# Patient Record
Sex: FEMALE | Race: WHITE | Employment: OTHER | URBAN - METROPOLITAN AREA
[De-identification: names, ages, dates, MRNs, and addresses within clinical notes are randomized per-mention and may not be internally consistent; named-entity substitution may affect disease eponyms.]

---

## 2017-04-03 ENCOUNTER — HOSPITAL ENCOUNTER (OUTPATIENT)
Dept: WOMENS IMAGING | Age: 62
Discharge: HOME | End: 2017-04-03
Attending: FAMILY MEDICINE | Admitting: FAMILY MEDICINE

## 2017-04-03 DIAGNOSIS — Z12.39 SCREENING BREAST EXAMINATION: ICD-10-CM

## 2017-09-18 ENCOUNTER — HOSPITAL ENCOUNTER (OUTPATIENT)
Dept: ULTRASOUND IMAGING | Age: 62
Discharge: HOME | End: 2017-09-18
Admitting: SPECIALIST

## 2017-09-18 DIAGNOSIS — K74.3 PRIMARY BILIARY CIRRHOSIS (HCC): ICD-10-CM

## 2019-03-01 ENCOUNTER — HOSPITAL ENCOUNTER (OUTPATIENT)
Dept: WOMENS IMAGING | Age: 64
Discharge: HOME | End: 2019-03-01
Payer: MEDICARE

## 2019-03-01 ENCOUNTER — HOSPITAL ENCOUNTER (OUTPATIENT)
Age: 64
Discharge: HOME | End: 2019-03-01
Payer: MEDICARE

## 2019-03-01 DIAGNOSIS — Z12.31 ENCOUNTER FOR SCREENING MAMMOGRAM FOR BREAST CANCER: ICD-10-CM

## 2019-03-01 LAB
ALBUMIN SERPL-MCNC: 4.2 GM/DL (ref 3.4–5)
ALP BLD-CCNC: 131 IU/L (ref 40–129)
ALT SERPL-CCNC: 40 U/L (ref 10–40)
AST SERPL-CCNC: 35 IU/L (ref 15–37)
BILIRUB SERPL-MCNC: 0.4 MG/DL (ref 0–1)
BILIRUBIN DIRECT: 0.2 MG/DL (ref 0–0.3)
BILIRUBIN, INDIRECT: 0.2 MG/DL (ref 0–0.7)
IGG,SERUM: 1321 MG/DL (ref 723–1685)
IGM,SERUM: 521 MG/DL (ref 62–277)
TOTAL PROTEIN: 7.5 GM/DL (ref 6.4–8.2)

## 2019-03-01 PROCEDURE — 36415 COLL VENOUS BLD VENIPUNCTURE: CPT

## 2019-03-01 PROCEDURE — 86256 FLUORESCENT ANTIBODY TITER: CPT

## 2019-03-01 PROCEDURE — 80076 HEPATIC FUNCTION PANEL: CPT

## 2019-03-01 PROCEDURE — 86038 ANTINUCLEAR ANTIBODIES: CPT

## 2019-03-01 PROCEDURE — 86039 ANTINUCLEAR ANTIBODIES (ANA): CPT

## 2019-03-01 PROCEDURE — 83516 IMMUNOASSAY NONANTIBODY: CPT

## 2019-03-01 PROCEDURE — 77067 SCR MAMMO BI INCL CAD: CPT

## 2019-03-01 PROCEDURE — 82784 ASSAY IGA/IGD/IGG/IGM EACH: CPT

## 2019-03-01 PROCEDURE — 84165 PROTEIN E-PHORESIS SERUM: CPT

## 2019-03-03 LAB
ANTI-MITOCHON TITER: 145.9
ANTI-NUCLEAR ANTIBODY (ANA): DETECTED
F-ACTIN AB, IGG: 40

## 2019-03-04 LAB
ALBUMIN ELP: 3.9 GM/DL (ref 3.2–5.6)
ALPHA-1-GLOBULIN: 0.3 GM/DL (ref 0.1–0.4)
ALPHA-2-GLOBULIN: 0.7 GM/DL (ref 0.4–1.2)
BETA GLOBULIN: 1.1 GM/DL (ref 0.5–1.3)
GAMMA GLOBULIN: 1.6 GM/DL (ref 0.5–1.6)
SMOOTH MUSCLE AB IGG TITER: NORMAL
TOTAL PROTEIN: 7.5 GM/DL (ref 6.4–8.2)

## 2019-03-05 LAB
ANA CYTOPLASMIC TITER: NORMAL
ANTINUCLEAR ANTIBODY, HEP-2, IGG: NORMAL
INTERPRETATION: NORMAL

## 2020-11-24 ENCOUNTER — OFFICE VISIT (OUTPATIENT)
Dept: PRIMARY CARE CLINIC | Age: 65
End: 2020-11-24
Payer: MEDICARE

## 2020-11-24 ENCOUNTER — HOSPITAL ENCOUNTER (OUTPATIENT)
Age: 65
Setting detail: SPECIMEN
Discharge: HOME | End: 2020-11-24
Payer: MEDICARE

## 2020-11-24 VITALS — TEMPERATURE: 97.2 F

## 2020-11-24 PROCEDURE — G8428 CUR MEDS NOT DOCUMENT: HCPCS | Performed by: NURSE PRACTITIONER

## 2020-11-24 PROCEDURE — 1036F TOBACCO NON-USER: CPT | Performed by: NURSE PRACTITIONER

## 2020-11-24 PROCEDURE — 1123F ACP DISCUSS/DSCN MKR DOCD: CPT | Performed by: NURSE PRACTITIONER

## 2020-11-24 PROCEDURE — 1090F PRES/ABSN URINE INCON ASSESS: CPT | Performed by: NURSE PRACTITIONER

## 2020-11-24 PROCEDURE — G8421 BMI NOT CALCULATED: HCPCS | Performed by: NURSE PRACTITIONER

## 2020-11-24 PROCEDURE — U0002 COVID-19 LAB TEST NON-CDC: HCPCS

## 2020-11-24 PROCEDURE — 4040F PNEUMOC VAC/ADMIN/RCVD: CPT | Performed by: NURSE PRACTITIONER

## 2020-11-24 PROCEDURE — G8484 FLU IMMUNIZE NO ADMIN: HCPCS | Performed by: NURSE PRACTITIONER

## 2020-11-24 PROCEDURE — G8400 PT W/DXA NO RESULTS DOC: HCPCS | Performed by: NURSE PRACTITIONER

## 2020-11-24 PROCEDURE — 3017F COLORECTAL CA SCREEN DOC REV: CPT | Performed by: NURSE PRACTITIONER

## 2020-11-24 PROCEDURE — 99213 OFFICE O/P EST LOW 20 MIN: CPT | Performed by: NURSE PRACTITIONER

## 2020-11-24 NOTE — PATIENT INSTRUCTIONS
Your COVID 19 test can take 3-5 days for the results come back. We ask that you make a Mychart page and view your test results this way. You will need to Self quarantine until you know your results. Increase fluids rest  Saline nasal spray as directed  Warm salt gargles for throat discomfort  Monitor temperature twice a day  Tylenol for fevers and/or discomfort. If symptoms are worse -Go to the ER. Follow up with your primary doctor    To Whom it May Concern:    Eloy Rosa has been tested for COVID on 11/24/20. They may NOT return to work until their lab test results back and they been fever free for 3 days. If test is positive they must stay home for 2 weeks or until they test negative or as directed by the [facility] Department.

## 2020-11-24 NOTE — PROGRESS NOTES
11/24/20  [facility]  1955    FLU/COVID-19 CLINIC EVALUATION    HPI SYMPTOMS: Pt's brother is (+) Covid    Employer: Retired    [] Fevers  [] Chills  [x] Cough  [] Coughing up blood  [] Chest Congestion  [x] Nasal Congestion  [] Feeling short of breath  [] Sometimes  [] Frequently  [] All the time  [] Chest pain  [x] Headaches  [x]Tolerable  [] Severe  [x] Sore throat  [] Muscle aches  [x] Nausea  [] Vomiting  []Unable to keep fluids down  [] Diarrhea  []Severe    [x] OTHER SYMPTOMS: FATIGUE      Symptom Duration:   [] 1  [] 2   [] 3   [] 4    [] 5   [x] 6   [] 7   [] 8   [] 9   [] 10   [] 11   [] 12   [] 13   [] 14   [] Longer than 14 days    Symptom course:   [x] Worsening     [] Stable     [] Improving    RISK FACTORS:    [] Pregnant or possibly pregnant  [x] Age over 61  [] Diabetes  [] Heart disease  [] Asthma  [] COPD/Other chronic lung diseases  [] Active Cancer  [] On Chemotherapy  [] Taking oral steroids  [] History Lymphoma/Leukemia  [x] Close contact with a lab confirmed COVID-19 patient within 14 days of symptom onset  [] History of travel from affected geographical areas within 14 days of symptom onset       VITALS:  There were no vitals filed for this visit. TESTS:    POCT FLU:  [] Positive     []Negative    ASSESSMENT:    [] Flu  [] Possible COVID-19  [] Strep    PLAN:    [] Discharge home with written instructions for:  [] Flu management  [] Possible COVID-19 infection with self-quarantine and management of symptoms  [] Follow-up with primary care physician or emergency department if worsens  [] Evaluation per physician/NP/PA in clinic  [] Sent to ER       An  electronic signature was used to authenticate this note.      --Flakita Anguiano MA on 11/24/2020 at 10:40 AM

## 2020-11-25 LAB
SARS-COV-2: DETECTED
SOURCE: ABNORMAL

## 2021-04-12 ENCOUNTER — HOSPITAL ENCOUNTER (OUTPATIENT)
Dept: WOMENS IMAGING | Age: 66
Discharge: HOME | End: 2021-04-12
Payer: MEDICARE

## 2021-04-12 DIAGNOSIS — Z12.31 SCREENING MAMMOGRAM, ENCOUNTER FOR: ICD-10-CM

## 2021-04-12 PROCEDURE — 77067 SCR MAMMO BI INCL CAD: CPT

## 2021-06-01 ENCOUNTER — HOSPITAL ENCOUNTER (OUTPATIENT)
Dept: ULTRASOUND IMAGING | Age: 66
Discharge: HOME | End: 2021-06-01
Payer: MEDICARE

## 2021-06-01 ENCOUNTER — HOSPITAL ENCOUNTER (OUTPATIENT)
Age: 66
Discharge: HOME | End: 2021-06-01
Payer: MEDICARE

## 2021-06-01 DIAGNOSIS — K74.3 CHOLANGITIC CIRRHOSIS (HCC): ICD-10-CM

## 2021-06-01 LAB
ALBUMIN SERPL-MCNC: 4.2 GM/DL (ref 3.4–5)
ALP BLD-CCNC: 119 IU/L (ref 40–129)
ALT SERPL-CCNC: 36 U/L (ref 10–40)
AST SERPL-CCNC: 27 IU/L (ref 15–37)
BILIRUB SERPL-MCNC: 0.7 MG/DL (ref 0–1)
BILIRUBIN DIRECT: 0.2 MG/DL (ref 0–0.3)
BILIRUBIN, INDIRECT: 0.5 MG/DL (ref 0–0.7)
TOTAL PROTEIN: 6.9 GM/DL (ref 6.4–8.2)

## 2021-06-01 PROCEDURE — 36415 COLL VENOUS BLD VENIPUNCTURE: CPT

## 2021-06-01 PROCEDURE — 80076 HEPATIC FUNCTION PANEL: CPT

## 2021-06-01 PROCEDURE — 76705 ECHO EXAM OF ABDOMEN: CPT

## 2021-06-10 ENCOUNTER — HOSPITAL ENCOUNTER (OUTPATIENT)
Dept: CT IMAGING | Age: 66
Discharge: HOME | End: 2021-06-10
Payer: MEDICARE

## 2021-06-10 DIAGNOSIS — R10.10 UPPER ABDOMINAL PAIN: ICD-10-CM

## 2021-06-10 DIAGNOSIS — K74.60 HEPATIC CIRRHOSIS, UNSPECIFIED HEPATIC CIRRHOSIS TYPE, UNSPECIFIED WHETHER ASCITES PRESENT (HCC): ICD-10-CM

## 2021-06-10 DIAGNOSIS — R93.3 ABNORMAL VIRTUAL COLONOSCOPE: ICD-10-CM

## 2021-06-10 LAB
GFR AFRICAN AMERICAN: >60 ML/MIN/1.73M2
GFR NON-AFRICAN AMERICAN: >60 ML/MIN/1.73M2
POC CREATININE: 0.4 MG/DL (ref 0.6–1.1)

## 2021-06-10 PROCEDURE — 6360000004 HC RX CONTRAST MEDICATION: Performed by: SPECIALIST

## 2021-06-10 PROCEDURE — 2580000003 HC RX 258: Performed by: SPECIALIST

## 2021-06-10 PROCEDURE — 74170 CT ABD WO CNTRST FLWD CNTRST: CPT

## 2021-06-10 RX ORDER — SODIUM CHLORIDE 0.9 % (FLUSH) 0.9 %
10 SYRINGE (ML) INJECTION PRN
Status: DISCONTINUED | OUTPATIENT
Start: 2021-06-10 | End: 2021-06-11 | Stop reason: HOSPADM

## 2021-06-10 RX ADMIN — SODIUM CHLORIDE, PRESERVATIVE FREE 10 ML: 5 INJECTION INTRAVENOUS at 08:52

## 2021-06-10 RX ADMIN — IOPAMIDOL 95 ML: 755 INJECTION, SOLUTION INTRAVENOUS at 08:55

## 2021-06-16 ENCOUNTER — HOSPITAL ENCOUNTER (OUTPATIENT)
Age: 66
Discharge: HOME | End: 2021-06-16
Payer: MEDICARE

## 2021-06-16 LAB
ALBUMIN SERPL-MCNC: 4.4 GM/DL (ref 3.4–5)
ALP BLD-CCNC: 119 IU/L (ref 40–129)
ALT SERPL-CCNC: 28 U/L (ref 10–40)
AST SERPL-CCNC: 24 IU/L (ref 15–37)
BILIRUB SERPL-MCNC: 0.6 MG/DL (ref 0–1)
BILIRUBIN DIRECT: 0.2 MG/DL (ref 0–0.3)
BILIRUBIN, INDIRECT: 0.4 MG/DL (ref 0–0.7)
TOTAL PROTEIN: 7 GM/DL (ref 6.4–8.2)

## 2021-06-16 PROCEDURE — 80076 HEPATIC FUNCTION PANEL: CPT

## 2021-06-16 PROCEDURE — 82105 ALPHA-FETOPROTEIN SERUM: CPT

## 2021-06-16 PROCEDURE — 36415 COLL VENOUS BLD VENIPUNCTURE: CPT

## 2021-06-16 NOTE — PROGRESS NOTES
Patient Name:  Amarilis Aleman  Patient :  1955  Patient MRN:  [id number]     Primary Oncologist: Keely Cat MD  Referring Provider: Holli Rothman MD     Date of Service: 2021      Reason for Consult: ? Hepatocellular carcinoma     Chief Complaint:    Chief Complaint   Patient presents with    New Patient        There is no problem list on file for this patient. HPI:   Alex Stewart is a pleasant 58-year-old female patient who was referred for evaluation of ?hepatocellular carcinoma. She used to live in [place] and moved back to [facility] in 2017. She was diagnosed with primary biliary cholangitis in [place] and has been on Actigall for about 10 years. She was diagnosed with pulmonary sarcoidosis in  and at that time she was treated with high-dose prednisone. She gained about 60 pounds and eventually has lost 100 pounds slowly in the last 10 years. In 2019 VIOLETTA was detected, antimitochondrial titer was 145.9H, F-actin IgG was 40H. IgG was 1321, IgM 521H. Ultrasound of abdomen on 2021 showed  Cirrhotic liver.  Hyperechoic irregular liver lesion to the right lobe of the  liver, new from prior exam, indeterminate.  Neoplastic etiology is of concern  in the setting of cirrhosis.  Recommend further evaluation with dedicated  liver mass protocol MRI with and without contrast.   Stones and sludge within the gallbladder without sonographic findings for  acute cholecystitis.   Mildly prominent common bile duct at 8 mm.  No intrahepatic biliary ductal  dilatation is noted.  Clinical and laboratory correlation     CT abdomen on Sarita 10, 2021 showed  1. Vague area of hypoenhancement centered in segment 8 but extends into  segments 4A and B measuring approximately 4.4 x 2.5 x 6.6 cm in maximum  dimensions (transverse by AP by CC).   Findings correlate to recent ultrasound  findings.  The arterial phase is otherwise suboptimal on today's exam due to  early scanning/bolus.  Findings remain indeterminate for hepatocellular  carcinoma.  Subtle vessels however can be seen coursing through these areas  favoring areas of focal fatty infiltration.  Recommend dedicated liver MRI if  the patient is able.  Also recommend correlation with AFP. 2. Cirrhosis with portal hypertension. 3. Cholelithiasis. 4. Nonobstructing bilateral renal calculi. In 2017 WBC was 4.5, Hg 14.1 and platelet 177. Serum alpha-fetoprotein on June 16, 2021 was 4. LFTs were unremarkable. Mammogram in April 2021 was benign. She is scheduled for upper and lower endoscopic study in the near future. Is otherwise feeling great today. She is scheduled for MRI of of the liver on June 29, 2021. Depending on the results we may consider biopsy of the liver. I also discussed about potential genetic counseling due to family history of malignancy. Past Medical History:   Seasonal allergy. HTN/white coat, sarcoidosis. PBC with liver cirrhosis. .    Past Surgery History:   BIlateral hip prosthesis. Social History:   She denied any history of smoking. No alcohol or illicit drug use. She has 2 daughters. Family History:   Father had colon cancer, prostate cancer and melanoma. Mother had non-Hodgkin lymphoma. Allergies   Allergen Reactions    Prednisone      Has a sensitivity       Current Outpatient Medications on File Prior to Visit   Medication Sig Dispense Refill    ursodiol (ACTIGALL) 300 MG capsule Take 1 capsule by mouth 4 times daily      aspirin 325 MG tablet Take 325 mg by mouth daily      Multiple Vitamins-Minerals (MULTIVITAMIN WOMEN 50+ PO) Take 1 tablet by mouth daily       No current facility-administered medications on file prior to visit. Review of Systems:    Constitutional:  No weight loss, No fever, No chills, No night sweats. Energy level good. Eyes:  No diplopia, No transient or permanent loss of vision, No scotomata.   ENT / Mouth:  No epistaxis, No dysphagia, No hoarseness, No oral ulcers, No gingival bleeding. No sore throat, No postnasal drip, No nasal drip, No mouth pain, No sinus pain, No tinnitus, Normal hearing. Cardiovascular:  No chest pain, No palpitations, No syncope, No upper extremity edema, No lower extremity edema, No calf discomfort. Respiratory:  No cough. No hemoptysis, No pleurisy, No wheezing, No dyspnea. Breast:  No breast mass, No pain, No nipple discharge, No change in size, No change in shape. Gastrointestinal:  No abdominal pain, No abdominal cramping, No nausea, No vomiting, No constipation, No diarrhea, No hematochezia, No melena, No jaundice, No dyspepsia, No dysphagia. Urinary:  No dysuria, No hematuria, No urinary incontinence. Gynecological:  No vaginal discharge, No suprapubic pain, No abnormal vaginal bleeding. (Female Patients Only)  Musculoskeletal:  No muscle pain, No swollen joints, No joint redness, No bone pain, No spine tenderness. Skin:  No rash, No nodules, No pruritus, No lesions. Neurologic:  No confusion, No seizures, No syncope, No tremor, No speech change, No headache, No hiccups, No abnormal gait, No sensory changes, No weakness. Psychiatric:  No depression, No anxiety, Concentration normal.  Endocrine:  No polyuria, No polydipsia, No hot flashes, No thyroid symptoms. Hematologic:  No epistaxis, No gingival bleeding, No petechiae, No ecchymosis. Lymphatic:  No lymphadenopathy, No lymphedema. Allergy / Immunologic:  No eczema, No frequent mucous infections, No frequent respiratory infections, No recurrent urticarial, No frequent skin infections.      Vital Signs: BP (!) 218/95 (Site: Left Upper Arm, Position: Sitting, Cuff Size: Medium Adult)   Pulse 79   Temp 96 °F (35.6 °C) (Infrared)   Resp 12   Ht 5' 3\" (1.6 m)   Wt 199 lb 6.4 oz (90.4 kg)   SpO2 96%   BMI 35.32 kg/m²      Physical Exam:  CONSTITUTIONAL: awake, alert, cooperative, no apparent distress   EYES: pupils equal, round and reactive to light, Component Value Date    PROTIME 11.6 09/18/2017    INR 1.02 09/18/2017    APTT 24.8 09/18/2017      Observations:  PHQ-9 Total Score: 0 (6/23/2021 10:00 AM)     Assessment & Plan:  1. She was suspected to have hepatocellular carcinoma with an underlying primary biliary cholangitis. She is on Actigall. Serum alpha-fetoprotein was 4. She is scheduled for MRI of the liver on June 29, 2021. We went over briefly the NCCN guideline. 2.  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 I will check CBC today. 3.  She has primary biliary cholangitis. She is on Actigall. She is scheduled for upper and lower endoscopic study in the near future. 4.  I referred to cardiologist for work-up of heart normal.    Return to clinic in 2 to 3 weeks or sooner. All of her question has been answered for today. I have discussed the above stated plan with the patient and they verbalized understanding and agreed with the plan. Thank you for allowing us to participate in this patient's care.       Electronically signed by Chetan Fuentes MD on 6/16/21 at 8:27 AM EDT

## 2021-06-17 LAB — MS ALPHA-FETOPROTEIN: 4 NG/ML (ref 0–9)

## 2021-06-23 ENCOUNTER — HOSPITAL ENCOUNTER (OUTPATIENT)
Dept: INFUSION THERAPY | Age: 66
Discharge: HOME | End: 2021-06-23
Payer: MEDICARE

## 2021-06-23 ENCOUNTER — INITIAL CONSULT (OUTPATIENT)
Dept: ONCOLOGY | Age: 66
End: 2021-06-23
Payer: MEDICARE

## 2021-06-23 VITALS
SYSTOLIC BLOOD PRESSURE: 218 MMHG | RESPIRATION RATE: 12 BRPM | HEIGHT: 63 IN | HEART RATE: 79 BPM | DIASTOLIC BLOOD PRESSURE: 95 MMHG | BODY MASS INDEX: 35.33 KG/M2 | OXYGEN SATURATION: 96 % | WEIGHT: 199.4 LBS | TEMPERATURE: 96 F

## 2021-06-23 DIAGNOSIS — R01.1 HEART MURMUR: ICD-10-CM

## 2021-06-23 DIAGNOSIS — K74.3 HEPATIC CIRRHOSIS DUE TO PRIMARY BILIARY CHOLANGITIS (HCC): ICD-10-CM

## 2021-06-23 DIAGNOSIS — K74.3 HEPATIC CIRRHOSIS DUE TO PRIMARY BILIARY CHOLANGITIS (HCC): Primary | ICD-10-CM

## 2021-06-23 LAB
BASOPHILS ABSOLUTE: 0 K/CU MM
BASOPHILS RELATIVE PERCENT: 0.6 % (ref 0–1)
DIFFERENTIAL TYPE: ABNORMAL
EOSINOPHILS ABSOLUTE: 0.2 K/CU MM
EOSINOPHILS RELATIVE PERCENT: 3.4 % (ref 0–3)
HCT VFR BLD CALC: 41.7 % (ref 37–47)
HEMOGLOBIN: 14.4 GM/DL (ref 12.5–16)
LYMPHOCYTES ABSOLUTE: 1.1 K/CU MM
LYMPHOCYTES RELATIVE PERCENT: 22.7 % (ref 24–44)
MCH RBC QN AUTO: 29.3 PG (ref 27–31)
MCHC RBC AUTO-ENTMCNC: 34.5 % (ref 32–36)
MCV RBC AUTO: 84.8 FL (ref 78–100)
MONOCYTES ABSOLUTE: 0.6 K/CU MM
MONOCYTES RELATIVE PERCENT: 12.8 % (ref 0–4)
PDW BLD-RTO: 15.3 % (ref 11.7–14.9)
PLATELET # BLD: 91 K/CU MM (ref 140–440)
PMV BLD AUTO: 11 FL (ref 7.5–11.1)
RBC # BLD: 4.92 M/CU MM (ref 4.2–5.4)
SEGMENTED NEUTROPHILS ABSOLUTE COUNT: 2.9 K/CU MM
SEGMENTED NEUTROPHILS RELATIVE PERCENT: 60.5 % (ref 36–66)
WBC # BLD: 4.8 K/CU MM (ref 4–10.5)

## 2021-06-23 PROCEDURE — 99202 OFFICE O/P NEW SF 15 MIN: CPT

## 2021-06-23 PROCEDURE — 4040F PNEUMOC VAC/ADMIN/RCVD: CPT | Performed by: INTERNAL MEDICINE

## 2021-06-23 PROCEDURE — 1123F ACP DISCUSS/DSCN MKR DOCD: CPT | Performed by: INTERNAL MEDICINE

## 2021-06-23 PROCEDURE — 36415 COLL VENOUS BLD VENIPUNCTURE: CPT

## 2021-06-23 PROCEDURE — 99205 OFFICE O/P NEW HI 60 MIN: CPT | Performed by: INTERNAL MEDICINE

## 2021-06-23 PROCEDURE — 3017F COLORECTAL CA SCREEN DOC REV: CPT | Performed by: INTERNAL MEDICINE

## 2021-06-23 PROCEDURE — G8400 PT W/DXA NO RESULTS DOC: HCPCS | Performed by: INTERNAL MEDICINE

## 2021-06-23 PROCEDURE — G8427 DOCREV CUR MEDS BY ELIG CLIN: HCPCS | Performed by: INTERNAL MEDICINE

## 2021-06-23 PROCEDURE — 85025 COMPLETE CBC W/AUTO DIFF WBC: CPT

## 2021-06-23 PROCEDURE — G8417 CALC BMI ABV UP PARAM F/U: HCPCS | Performed by: INTERNAL MEDICINE

## 2021-06-23 PROCEDURE — 1036F TOBACCO NON-USER: CPT | Performed by: INTERNAL MEDICINE

## 2021-06-23 PROCEDURE — 1090F PRES/ABSN URINE INCON ASSESS: CPT | Performed by: INTERNAL MEDICINE

## 2021-06-23 RX ORDER — URSODIOL 300 MG/1
1 CAPSULE ORAL 4 TIMES DAILY
COMMUNITY
Start: 2021-04-16

## 2021-06-23 RX ORDER — ASPIRIN 325 MG
325 TABLET ORAL DAILY
COMMUNITY
End: 2021-09-14

## 2021-06-23 ASSESSMENT — PATIENT HEALTH QUESTIONNAIRE - PHQ9
SUM OF ALL RESPONSES TO PHQ QUESTIONS 1-9: 0
SUM OF ALL RESPONSES TO PHQ QUESTIONS 1-9: 0
2. FEELING DOWN, DEPRESSED OR HOPELESS: 0
1. LITTLE INTEREST OR PLEASURE IN DOING THINGS: 0
SUM OF ALL RESPONSES TO PHQ9 QUESTIONS 1 & 2: 0
SUM OF ALL RESPONSES TO PHQ QUESTIONS 1-9: 0

## 2021-06-23 NOTE — PROGRESS NOTES
MA Rooming Questions  Patient: Gustabo Mattson  MRN: [id number]    Date: 6/23/2021        NP    5. Did the patient have a depression screening completed today?  Yes    PHQ-9 Total Score: 0 (6/23/2021 10:00 AM)       PHQ-9 Given to (if applicable):               PHQ-9 Score (if applicable):                     [] Positive     [x]  Negative              Does question #9 need addressed (if applicable)                     [] Yes    []  No               Neiad Johnston CMA

## 2021-06-24 NOTE — PROGRESS NOTES
Patient Name:  León Loya  Patient :  1955  Patient MRN:  [id number]     Primary Oncologist: Ty Eng MD  Referring Provider: Bhargavi Palm MD     Date of Service: 2021         Chief Complaint:    Chief Complaint   Patient presents with    Follow-up     Sh came in for follow up visit. There is no problem list on file for this patient. HPI:   Lalita Taylor is a pleasant 64-year-old female patient who was referred for evaluation of ?hepatocellular carcinoma. She used to live in [place] and moved back to [facility] in 2017. She was diagnosed with primary biliary cholangitis in [place] and has been on Actigall for about 10 years. She was diagnosed with pulmonary sarcoidosis in  and at that time she was treated with high-dose prednisone. She gained about 60 pounds and eventually has lost 100 pounds slowly in the last 10 years. In 2019 VIOLETTA was detected, antimitochondrial titer was 145.9H, F-actin IgG was 40H. IgG was 1321, IgM 521H. Ultrasound of abdomen on 2021 showed  Cirrhotic liver.  Hyperechoic irregular liver lesion to the right lobe of the  liver, new from prior exam, indeterminate.  Neoplastic etiology is of concern  in the setting of cirrhosis.  Recommend further evaluation with dedicated  liver mass protocol MRI with and without contrast.   Stones and sludge within the gallbladder without sonographic findings for  acute cholecystitis.   Mildly prominent common bile duct at 8 mm.  No intrahepatic biliary ductal  dilatation is noted.  Clinical and laboratory correlation     CT abdomen on Sarita 10, 2021 showed  1. Vague area of hypoenhancement centered in segment 8 but extends into  segments 4A and B measuring approximately 4.4 x 2.5 x 6.6 cm in maximum  dimensions (transverse by AP by CC).   Findings correlate to recent ultrasound  findings.  The arterial phase is otherwise suboptimal on today's exam due to  early scanning/bolus.  Findings remain indeterminate for hepatocellular  carcinoma.  Subtle vessels however can be seen coursing through these areas  favoring areas of focal fatty infiltration.  Recommend dedicated liver MRI if  the patient is able.  Also recommend correlation with AFP. 2. Cirrhosis with portal hypertension. 3. Cholelithiasis. 4. Nonobstructing bilateral renal calculi. In 2017 WBC was 4.5, Hg 14.1 and platelet 460. Serum alpha-fetoprotein on June 16, 2021 was 4. LFTs were unremarkable. Mammogram in April 2021 was benign. She is scheduled for upper and lower endoscopic study in the near future. Is otherwise feeling great today. She is scheduled for MRI of of the liver on June 29, 2021. Depending on the results we may consider biopsy of the liver. I also discussed about potential genetic counseling due to family history of malignancy. On July 7, 2021 she came in for follow-up visit. MRI of liver 6/29/2021:  Signal abnormality on the CT exam loses signal on out of phase images,  compatible with fat.  Contrast was not administered on this study, therefore  evaluation for enhancement in this region is not possible.  While focal fat  is favored, it is noted that arterial phase on recent CT exam with  suboptimal.  Recommend correlation with AFP levels.  Consider follow-up  imaging in 3 months time, with multi phasic imaging, to exclude any  hypervascular nodule given the underlying cirrhosis.   Cirrhosis and splenomegaly.  Spine scattered splenic siderotic nodules are  incidentally noted   Cholelithiasis  Serum alpha-fetoprotein was 4. She is currently asymptomatic. She is agreeable to a follow-up MRI with liver protocol in September 2021. Mammogram in April 2021 was benign. She denied any acute pain. No nausea, vomiting or diarrhea. No fever or chills. No bowel habit changes. No dysuria or hematuria.   She has been seen by cardiologist.  She is scheduled for the stress test.    Past Medical History: 60.5 06/23/2021    EOSRELPCT 3.4 (H) 06/23/2021    BASOPCT 0.6 06/23/2021    LYMPHOPCT 22.7 (L) 06/23/2021    MONOPCT 12.8 (H) 06/23/2021    SEGSABS 2.9 06/23/2021    EOSABS 0.2 06/23/2021    BASOSABS 0.0 06/23/2021    LYMPHSABS 1.1 06/23/2021    MONOSABS 0.6 06/23/2021    DIFFTYPE AUTOMATED DIFFERENTIAL 06/23/2021     Chemistry:  Lab Results   Component Value Date    CREATININE 0.4 (L) 06/10/2021    PROT 7.0 06/16/2021    LABALBU 4.4 06/16/2021    BILITOT 0.6 06/16/2021    ALKPHOS 119 06/16/2021    AST 24 06/16/2021    ALT 28 06/16/2021    LABGLOM >60 06/10/2021    GFRAA >60 06/10/2021     Immunology:  Lab Results   Component Value Date    PROT 7.0 06/16/2021    ALBUMINELP 3.9 03/01/2019    LABALPH 0.3 03/01/2019    LABALPH 0.7 03/01/2019    LABBETA 1.1 03/01/2019    GAMGLOB 1.6 03/01/2019     Coagulation Panel:  Lab Results   Component Value Date    PROTIME 11.6 09/18/2017    INR 1.02 09/18/2017    APTT 24.8 09/18/2017      Observations:  No data recorded     Assessment & Plan:  1. She was suspected to have hepatocellular carcinoma with an underlying primary biliary cholangitis. She is on Actigall. Serum alpha-fetoprotein in June 2021 was 4. MRI of the liver in June 2021 was reviewed. She is currently asymptomatic. She is agreeable to have follow-up MRI of the liver in September 2021.    2.  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 Platelet in June 5260 was 91.    3.  She has primary biliary cholangitis. She is on Actigall. She is scheduled for upper and lower endoscopic study in the near future. 4.  I referred to cardiologist for work-up of heart normal.  She is scheduled for stress test on July 8, 2021. Return to clinic in 8 weeks or sooner. All of her question has been answered for today.       Electronically signed by Manuel Wright MD on 6/16/21 at 8:27 AM EDT

## 2021-06-29 ENCOUNTER — HOSPITAL ENCOUNTER (OUTPATIENT)
Dept: MRI IMAGING | Age: 66
Discharge: HOME | End: 2021-06-29
Payer: MEDICARE

## 2021-06-29 DIAGNOSIS — C22.9 MALIGNANT NEOPLASM OF LIVER, UNSPECIFIED LIVER MALIGNANCY TYPE (HCC): ICD-10-CM

## 2021-06-29 PROCEDURE — 74181 MRI ABDOMEN W/O CONTRAST: CPT

## 2021-07-07 ENCOUNTER — OFFICE VISIT (OUTPATIENT)
Dept: ONCOLOGY | Age: 66
End: 2021-07-07
Payer: MEDICARE

## 2021-07-07 ENCOUNTER — HOSPITAL ENCOUNTER (OUTPATIENT)
Dept: INFUSION THERAPY | Age: 66
Discharge: HOME | End: 2021-07-07
Payer: MEDICARE

## 2021-07-07 VITALS
TEMPERATURE: 98.8 F | OXYGEN SATURATION: 98 % | DIASTOLIC BLOOD PRESSURE: 88 MMHG | HEART RATE: 78 BPM | BODY MASS INDEX: 34.52 KG/M2 | SYSTOLIC BLOOD PRESSURE: 162 MMHG | WEIGHT: 194.8 LBS | HEIGHT: 63 IN | RESPIRATION RATE: 18 BRPM

## 2021-07-07 DIAGNOSIS — K76.9 LIVER LESION: Primary | ICD-10-CM

## 2021-07-07 DIAGNOSIS — D68.9 COAGULATION DEFECT (HCC): ICD-10-CM

## 2021-07-07 PROCEDURE — 99213 OFFICE O/P EST LOW 20 MIN: CPT | Performed by: INTERNAL MEDICINE

## 2021-07-07 PROCEDURE — 1090F PRES/ABSN URINE INCON ASSESS: CPT | Performed by: INTERNAL MEDICINE

## 2021-07-07 PROCEDURE — 1123F ACP DISCUSS/DSCN MKR DOCD: CPT | Performed by: INTERNAL MEDICINE

## 2021-07-07 PROCEDURE — 4040F PNEUMOC VAC/ADMIN/RCVD: CPT | Performed by: INTERNAL MEDICINE

## 2021-07-07 PROCEDURE — G8417 CALC BMI ABV UP PARAM F/U: HCPCS | Performed by: INTERNAL MEDICINE

## 2021-07-07 PROCEDURE — 99211 OFF/OP EST MAY X REQ PHY/QHP: CPT

## 2021-07-07 PROCEDURE — G8400 PT W/DXA NO RESULTS DOC: HCPCS | Performed by: INTERNAL MEDICINE

## 2021-07-07 PROCEDURE — 1036F TOBACCO NON-USER: CPT | Performed by: INTERNAL MEDICINE

## 2021-07-07 PROCEDURE — G8427 DOCREV CUR MEDS BY ELIG CLIN: HCPCS | Performed by: INTERNAL MEDICINE

## 2021-07-07 PROCEDURE — 3017F COLORECTAL CA SCREEN DOC REV: CPT | Performed by: INTERNAL MEDICINE

## 2021-07-07 NOTE — PROGRESS NOTES
MA Rooming Questions  Patient: Audra Mcqueen  MRN: [id number]    Date: 7/7/2021        1. Do you have any new issues?   no         2. Do you need any refills on medications?    no    3. Have you had any imaging done since your last visit? Yes- MRI    4. Have you been hospitalized or seen in the emergency room since your last visit here?   no    5. Did the patient have a depression screening completed today?  No    No data recorded     PHQ-9 Given to (if applicable):               PHQ-9 Score (if applicable):                     [] Positive     []  Negative              Does question #9 need addressed (if applicable)                     [] Yes    []  No               Marivel Carrera CMA

## 2021-08-15 NOTE — PROGRESS NOTES
Patient Name:  Luigi Prader  Patient :  1955  Patient MRN:  [id number]     Primary Oncologist: Juan Alberto Mattson MD  Referring Provider: Davis White MD     Date of Service: 2021         Chief Complaint:    Chief Complaint   Patient presents with    Follow-up    Results     MRI     Sh came in for follow up visit. There is no problem list on file for this patient. HPI:   Carlota Hart is a pleasant 24-year-old female patient who was referred for evaluation of ?hepatocellular carcinoma. She used to live in [place] and moved back to [facility] in 2017. She was diagnosed with primary biliary cholangitis in [place] and has been on Actigall for about 10 years. She was diagnosed with pulmonary sarcoidosis in  and at that time she was treated with high-dose prednisone. She gained about 60 pounds and eventually has lost 100 pounds slowly in the last 10 years. In 2019 VIOLETTA was detected, antimitochondrial titer was 145.9H, F-actin IgG was 40H. IgG was 1321, IgM 521H. Ultrasound of abdomen on 2021 showed  Cirrhotic liver.  Hyperechoic irregular liver lesion to the right lobe of the  liver, new from prior exam, indeterminate.  Neoplastic etiology is of concern  in the setting of cirrhosis.  Recommend further evaluation with dedicated  liver mass protocol MRI with and without contrast.   Stones and sludge within the gallbladder without sonographic findings for  acute cholecystitis.   Mildly prominent common bile duct at 8 mm.  No intrahepatic biliary ductal  dilatation is noted.  Clinical and laboratory correlation     CT abdomen on Sarita 10, 2021 showed  1. Vague area of hypoenhancement centered in segment 8 but extends into  segments 4A and B measuring approximately 4.4 x 2.5 x 6.6 cm in maximum  dimensions (transverse by AP by CC).   Findings correlate to recent ultrasound  findings.  The arterial phase is otherwise suboptimal on today's exam due to  early scanning/bolus.  Findings remain indeterminate for hepatocellular  carcinoma.  Subtle vessels however can be seen coursing through these areas  favoring areas of focal fatty infiltration.  Recommend dedicated liver MRI if  the patient is able.  Also recommend correlation with AFP. 2. Cirrhosis with portal hypertension. 3. Cholelithiasis. 4. Nonobstructing bilateral renal calculi. In 2017 WBC was 4.5, Hg 14.1 and platelet 814. Serum alpha-fetoprotein on June 16, 2021 was 4. LFTs were unremarkable. Mammogram in April 2021 was benign. She is scheduled for upper and lower endoscopic study in the near future. Is otherwise feeling great today. She is scheduled for MRI of of the liver on June 29, 2021. Depending on the results we may consider biopsy of the liver. I also discussed about potential genetic counseling due to family history of malignancy. MRI of liver 6/29/2021:  Signal abnormality on the CT exam loses signal on out of phase images,  compatible with fat.  Contrast was not administered on this study, therefore  evaluation for enhancement in this region is not possible.  While focal fat  is favored, it is noted that arterial phase on recent CT exam with  suboptimal.  Recommend correlation with AFP levels.  Consider follow-up  imaging in 3 months time, with multi phasic imaging, to exclude any  hypervascular nodule given the underlying cirrhosis.   Cirrhosis and splenomegaly.  Spine scattered splenic siderotic nodules are  incidentally noted   Cholelithiasis  Serum alpha-fetoprotein was 4. On September 14, 2020 when she came in for follow-up visit. MRI of abdomen in September 2021 showed  Cirrhosis.  No suspicious liver lesion.  Focal steatosis accounts for  hypodense area on CT.  Mild splenomegaly could be due to portal hypertension. Cholelithiasis. Mammogram in April 2021 was benign. She is scheduled for upper and lower endoscopic study in October 2021. No acute pain.   Denies any nausea, vomiting or diarrhea. No fever or chills. No chest pain, shortness of breath or palpitation. No headaches or dizzy spell. No specific bone pain. No melena or hematochezia. Denied any dysuria or hematuria. Past Medical History:   Seasonal allergy. HTN/white coat, sarcoidosis. PBC with liver cirrhosis. .    Past Surgery History:   BIlateral hip prosthesis. Social History:   She denied any history of smoking. No alcohol or illicit drug use. She has 2 daughters. Family History:   Father had colon cancer, prostate cancer and melanoma. Mother had non-Hodgkin lymphoma. Review of Systems: The remainder of ROS is unremarkable. Vital Signs: BP (!) 169/73 (Site: Right Upper Arm, Position: Sitting, Cuff Size: Large Adult)   Pulse 55   Temp 97 °F (36.1 °C) (Infrared)   Ht 5' 3\" (1.6 m)   Wt 189 lb 6.4 oz (85.9 kg)   SpO2 99%   BMI 33.55 kg/m²      Physical Exam:  CONSTITUTIONAL: awake, alert, cooperative, no apparent distress   EYES: pupils equal, round and reactive to light, sclera clear and conjunctiva normal  ENT: Normocephalic, without obvious abnormality, atraumatic  NECK: supple, symmetrical, no jugular venous distension and no carotid bruits   HEMATOLOGIC/LYMPHATIC: no cervical, supraclavicular or axillary lymphadenopathy   LUNGS: no increased work of breathing and clear to auscultation   CARDIOVASCULAR: regular rate and rhythm, normal S1 and S2, second right parasternal border murmur noted   ABDOMEN: normal bowel sound, soft, non-distended, non-tender, no masses palpated, no hepatosplenomegaly   MUSCULOSKELETAL: full range of motion noted, tone is normal  NEUROLOGIC: awake, alert, oriented to name, place and time. Motor skills grossly intact. Cranial nerves II through XII grossly intact  SKIN: Normal skin color, texture, turgor and no jaundice.  appears intact   EXTREMITIES: no LE edema  or cyanosis       Labs:  Hematology:  Lab Results   Component Value Date    WBC 4.8 primary biliary cholangitis. Return to clinic in 6 months or sooner. All of her question has been answered for today.

## 2021-08-25 ENCOUNTER — TELEPHONE (OUTPATIENT)
Dept: ONCOLOGY | Age: 66
End: 2021-08-25

## 2021-08-25 NOTE — TELEPHONE ENCOUNTER
Pt is going to [facility] on 9/10 for MRI-- 1030 arrival for 1100 appt-- npo 4 hours and no metal-- left message for pt with all information

## 2021-09-10 ENCOUNTER — HOSPITAL ENCOUNTER (OUTPATIENT)
Dept: MRI IMAGING | Age: 66
Discharge: HOME | End: 2021-09-10
Payer: MEDICARE

## 2021-09-10 DIAGNOSIS — K76.9 LIVER LESION: ICD-10-CM

## 2021-09-10 PROCEDURE — 74182 MRI ABDOMEN W/CONTRAST: CPT

## 2021-09-10 PROCEDURE — A9577 INJ MULTIHANCE: HCPCS | Performed by: INTERNAL MEDICINE

## 2021-09-10 PROCEDURE — 6360000004 HC RX CONTRAST MEDICATION: Performed by: INTERNAL MEDICINE

## 2021-09-10 RX ADMIN — GADOBENATE DIMEGLUMINE 17 ML: 529 INJECTION, SOLUTION INTRAVENOUS at 11:53

## 2021-09-14 ENCOUNTER — OFFICE VISIT (OUTPATIENT)
Dept: ONCOLOGY | Age: 66
End: 2021-09-14
Payer: MEDICARE

## 2021-09-14 ENCOUNTER — HOSPITAL ENCOUNTER (OUTPATIENT)
Dept: INFUSION THERAPY | Age: 66
Discharge: HOME | End: 2021-09-14
Payer: MEDICARE

## 2021-09-14 VITALS
SYSTOLIC BLOOD PRESSURE: 169 MMHG | DIASTOLIC BLOOD PRESSURE: 73 MMHG | TEMPERATURE: 97 F | BODY MASS INDEX: 33.56 KG/M2 | HEART RATE: 55 BPM | OXYGEN SATURATION: 99 % | WEIGHT: 189.4 LBS | HEIGHT: 63 IN

## 2021-09-14 DIAGNOSIS — K74.3 HEPATIC CIRRHOSIS DUE TO PRIMARY BILIARY CHOLANGITIS (HCC): Primary | ICD-10-CM

## 2021-09-14 PROCEDURE — 99211 OFF/OP EST MAY X REQ PHY/QHP: CPT

## 2021-09-14 PROCEDURE — 99213 OFFICE O/P EST LOW 20 MIN: CPT | Performed by: INTERNAL MEDICINE

## 2021-09-14 PROCEDURE — 3017F COLORECTAL CA SCREEN DOC REV: CPT | Performed by: INTERNAL MEDICINE

## 2021-09-14 PROCEDURE — 1090F PRES/ABSN URINE INCON ASSESS: CPT | Performed by: INTERNAL MEDICINE

## 2021-09-14 PROCEDURE — G8427 DOCREV CUR MEDS BY ELIG CLIN: HCPCS | Performed by: INTERNAL MEDICINE

## 2021-09-14 PROCEDURE — 1036F TOBACCO NON-USER: CPT | Performed by: INTERNAL MEDICINE

## 2021-09-14 PROCEDURE — G8417 CALC BMI ABV UP PARAM F/U: HCPCS | Performed by: INTERNAL MEDICINE

## 2021-09-14 PROCEDURE — 1123F ACP DISCUSS/DSCN MKR DOCD: CPT | Performed by: INTERNAL MEDICINE

## 2021-09-14 PROCEDURE — 4040F PNEUMOC VAC/ADMIN/RCVD: CPT | Performed by: INTERNAL MEDICINE

## 2021-09-14 PROCEDURE — G8400 PT W/DXA NO RESULTS DOC: HCPCS | Performed by: INTERNAL MEDICINE

## 2021-09-14 RX ORDER — LISINOPRIL 2.5 MG/1
TABLET ORAL DAILY
Status: ON HOLD | COMMUNITY
End: 2022-06-25 | Stop reason: HOSPADM

## 2021-09-14 RX ORDER — ASPIRIN 81 MG/1
81 TABLET ORAL DAILY
Status: ON HOLD | COMMUNITY
End: 2022-03-01 | Stop reason: ALTCHOICE

## 2021-09-14 NOTE — PROGRESS NOTES
MA Rooming Questions  Patient: Ricarda Moya  MRN: [id number]    Date: 9/14/2021        1. Do you have any new issues?   no         2. Do you need any refills on medications?    no    3. Have you had any imaging done since your last visit? yes - MRI    4. Have you been hospitalized or seen in the emergency room since your last visit here?   no    5. Did the patient have a depression screening completed today?  No    No data recorded     PHQ-9 Given to (if applicable):               PHQ-9 Score (if applicable):                     [] Positive     []  Negative              Does question #9 need addressed (if applicable)                     [] Yes    []  No               Mauricio Manuel CMA

## 2021-10-07 ENCOUNTER — HOSPITAL ENCOUNTER (OUTPATIENT)
Age: 66
Discharge: HOME | End: 2021-10-07
Payer: MEDICARE

## 2021-10-07 ENCOUNTER — OFFICE VISIT (OUTPATIENT)
Dept: FAMILY MEDICINE CLINIC | Age: 66
End: 2021-10-07
Payer: MEDICARE

## 2021-10-07 ENCOUNTER — HOSPITAL ENCOUNTER (OUTPATIENT)
Dept: GENERAL RADIOLOGY | Age: 66
Discharge: HOME | End: 2021-10-07
Payer: MEDICARE

## 2021-10-07 DIAGNOSIS — Z86.2 HX OF SARCOIDOSIS: ICD-10-CM

## 2021-10-07 DIAGNOSIS — R05.8 COUGH PRESENT FOR GREATER THAN 3 WEEKS: ICD-10-CM

## 2021-10-07 DIAGNOSIS — J01.40 ACUTE NON-RECURRENT PANSINUSITIS: Primary | ICD-10-CM

## 2021-10-07 PROCEDURE — 71046 X-RAY EXAM CHEST 2 VIEWS: CPT

## 2021-10-07 PROCEDURE — G8427 DOCREV CUR MEDS BY ELIG CLIN: HCPCS | Performed by: NURSE PRACTITIONER

## 2021-10-07 PROCEDURE — 4040F PNEUMOC VAC/ADMIN/RCVD: CPT | Performed by: NURSE PRACTITIONER

## 2021-10-07 PROCEDURE — 1036F TOBACCO NON-USER: CPT | Performed by: NURSE PRACTITIONER

## 2021-10-07 PROCEDURE — 1090F PRES/ABSN URINE INCON ASSESS: CPT | Performed by: NURSE PRACTITIONER

## 2021-10-07 PROCEDURE — 99213 OFFICE O/P EST LOW 20 MIN: CPT | Performed by: NURSE PRACTITIONER

## 2021-10-07 PROCEDURE — 1123F ACP DISCUSS/DSCN MKR DOCD: CPT | Performed by: NURSE PRACTITIONER

## 2021-10-07 PROCEDURE — G8484 FLU IMMUNIZE NO ADMIN: HCPCS | Performed by: NURSE PRACTITIONER

## 2021-10-07 PROCEDURE — G8400 PT W/DXA NO RESULTS DOC: HCPCS | Performed by: NURSE PRACTITIONER

## 2021-10-07 PROCEDURE — 3017F COLORECTAL CA SCREEN DOC REV: CPT | Performed by: NURSE PRACTITIONER

## 2021-10-07 PROCEDURE — G8417 CALC BMI ABV UP PARAM F/U: HCPCS | Performed by: NURSE PRACTITIONER

## 2021-10-07 RX ORDER — AMOXICILLIN AND CLAVULANATE POTASSIUM 875; 125 MG/1; MG/1
1 TABLET, FILM COATED ORAL 2 TIMES DAILY
Qty: 20 TABLET | Refills: 0 | Status: SHIPPED | OUTPATIENT
Start: 2021-10-07 | End: 2021-10-17

## 2021-10-07 NOTE — PROGRESS NOTES
10/7/21  [facility]  1955    FLU/COVID-19 CLINIC EVALUATION    HPI SYMPTOMS:    Employer:    [] Fevers  [] Chills  [x] Cough  [] Coughing up blood  [x] Chest Congestion  [x] Nasal Congestion  [x] Feeling short of breath  [] Sometimes  [x] Frequently  [] All the time  [] Chest pain  [] Headaches  []Tolerable  [] Severe  [] Sore throat  [] Muscle aches  [] Nausea  [] Vomiting  []Unable to keep fluids down  [] Diarrhea  []Severe    [x] OTHER SYMPTOMS: Fatigue     Symptom Duration:   [] 1  [] 2   [] 3   [] 4    [] 5   [] 6   [] 7   [] 8   [] 9   [] 10   [] 11   [] 12   [] 13   [] 14   [x] Longer than 14 days    Symptom course:   [] Worsening     [x] Stable     [] Improving    RISK FACTORS:    [] Pregnant or possibly pregnant  [] Age over 61  [] Diabetes  [] Heart disease  [] Asthma  [] COPD/Other chronic lung diseases  [] Active Cancer  [] On Chemotherapy  [] Taking oral steroids  [] History Lymphoma/Leukemia  [] Close contact with a lab confirmed COVID-19 patient within 14 days of symptom onset  [] History of travel from affected geographical areas within 14 days of symptom onset       VITALS:  There were no vitals filed for this visit. TESTS:    POCT FLU:  [] Positive     []Negative    ASSESSMENT:    [] Flu  [] Possible COVID-19  [] Strep    PLAN:    [] Discharge home with written instructions for:  [] Flu management  [] Possible COVID-19 infection with self-quarantine and management of symptoms  [] Follow-up with primary care physician or emergency department if worsens  [] Evaluation per physician/NP/PA in clinic  [] Sent to ER       An  electronic signature was used to authenticate this note.      --Destin Crump LPN on 50/4/8844 at 0:21 PM

## 2021-10-07 NOTE — PROGRESS NOTES
10/7/21    Chief Complaint   Patient presents with   Justin, (1955), is a 74 y.o. female, is here for evaluation of the following medical concerns:    HPI    She is being seen today through the [facility] [place]. PCP:  Dr. Marixa Boland    Cough:  She is here today with c/o a 2 month history of chest and nasal congestion, sob, fatigue, and cough with intermittent wheezing. She will have clear to yellow sinus drainage and sputum production. No symptoms are new, all have been consistent for the past 2 months. She has traveled during this time, and usually lives in the [facility], and feel that most of her symptoms are due to being back in [place] with allergens. She reports previous dx of sarcoidosis, but has not seen pulmonology since 2003. States her symptoms improved with moving. She was started on Lisinopril within the last 3 months. Previous hx of COVID-19 infection 11/2020. She has received her vaccines in March of 2021. Review of Systems    See HPI    Prior to Visit Medications    Medication Sig Taking? Authorizing Provider   apixaban (ELIQUIS) 5 MG TABS tablet Take by mouth 2 times daily  Historical Provider, MD   lisinopril (PRINIVIL;ZESTRIL) 2.5 MG tablet Take by mouth daily 1/2 tab qam and 1/2 tab qhs  Historical Provider, MD   metoprolol tartrate (LOPRESSOR) 25 MG tablet Take 25 mg by mouth daily  Historical Provider, MD   aspirin 81 MG EC tablet Take 81 mg by mouth daily  Historical Provider, MD   ursodiol (ACTIGALL) 300 MG capsule Take 1 capsule by mouth 4 times daily  Historical Provider, MD   Multiple Vitamins-Minerals (MULTIVITAMIN WOMEN 50+ PO) Take 1 tablet by mouth daily  Historical Provider, MD        Allergies   Allergen Reactions    Prednisone      Has a sensitivity  Other reaction(s): Joint Pain       No past medical history on file.     Past Surgical History:   Procedure Laterality Date    TOTAL HIP ARTHROPLASTY         Social History     Tobacco Use    Smoking status: Never Smoker    Smokeless tobacco: Never Used   Substance Use Topics    Alcohol use: Not Currently    Drug use: Never       Family History   Problem Relation Age of Onset    Other Mother         lymphoma    Stroke Mother     High Blood Pressure Mother     High Cholesterol Mother     Prostate Cancer Father     Colon Cancer Father     Other Father         Melanoma skin cancer    Diabetes Father     High Cholesterol Father        Lab Results   Component Value Date    WBC 4.8 06/23/2021    HGB 14.4 06/23/2021    HCT 41.7 06/23/2021    MCV 84.8 06/23/2021    PLT 91 (L) 06/23/2021     No results found for: LABA1C  No results found for: TSHFT4, TSH  No results found for: CHOL, TRIG, HDL, LDLCHOLESTEROL, LDLCALC, LABVLDL, VLDL, CHOLHDLRATIO      No results found for this visit on 10/07/21. Wt Readings from Last 3 Encounters:   10/07/21 190 lb 9.6 oz (86.5 kg)   09/14/21 189 lb 6.4 oz (85.9 kg)   07/07/21 194 lb 12.8 oz (88.4 kg)     . FLOWAMB[6   BP Readings from Last 3 Encounters:   09/14/21 (!) 169/73   07/07/21 (!) 162/88   06/23/21 (!) 218/95     Pulse Readings from Last 3 Encounters:   10/07/21 71   09/14/21 55   07/07/21 78      Narrative   EXAMINATION:   TWO XRAY VIEWS OF THE CHEST       10/7/2021 5:24 pm       COMPARISON:   None.       HISTORY:   ORDERING SYSTEM PROVIDED HISTORY: Hx of sarcoidosis   TECHNOLOGIST PROVIDED HISTORY:   Reason for exam:->2 month hx of productive cough   Reason for Exam: 2 month hx of productive cough, Hx of sarcoidosis, Cough   present for greater than 3 weeks       FINDINGS:   Moderate bilateral hilar prominence likely from underlying adenopathy.    Normal cardiac size.  1.6 cm rounded opacity in the right lower lung may   represent part of costal cartilage or a lung nodule.  Mild perihilar   interstitial prominence identified.       Moderate osteopenic changes and degenerative changes noted in the bony   structures.           Impression   Moderate bilateral hilar prominence, which could be due to hilar adenopathy. 1.6 cm rounded opacity right lower lung may be part of costal cartilage or a   lung nodule.  Mild perihilar interstitial prominence which could be due to   given history of sarcoidosis.       RECOMMENDATION:   CT of the chest is advised for more sensitive evaluation of the above   findings. Physical Exam  Vitals and nursing note reviewed. Constitutional:       General: She is not in acute distress. Appearance: Normal appearance. She is not ill-appearing, toxic-appearing or diaphoretic. HENT:      Head: Normocephalic and atraumatic. Right Ear: Tympanic membrane, ear canal and external ear normal.      Left Ear: Tympanic membrane, ear canal and external ear normal.      Nose: Nose normal. No congestion or rhinorrhea. Mouth/Throat:      Mouth: Mucous membranes are moist.      Pharynx: Oropharynx is clear. No oropharyngeal exudate or posterior oropharyngeal erythema. Eyes:      Extraocular Movements: Extraocular movements intact. Conjunctiva/sclera: Conjunctivae normal.      Pupils: Pupils are equal, round, and reactive to light. Cardiovascular:      Rate and Rhythm: Normal rate and regular rhythm. Pulses: Normal pulses. Heart sounds: Normal heart sounds. Pulmonary:      Effort: Pulmonary effort is normal. No respiratory distress. Breath sounds: Normal breath sounds. No stridor. No wheezing, rhonchi or rales. Chest:      Chest wall: No tenderness. Abdominal:      General: Abdomen is flat. Bowel sounds are normal. There is no distension. Palpations: Abdomen is soft. There is no mass. Tenderness: There is no abdominal tenderness. There is no right CVA tenderness, left CVA tenderness or guarding. Hernia: No hernia is present. Musculoskeletal:         General: No swelling or tenderness. Normal range of motion. Cervical back: Normal range of motion and neck supple. No rigidity.  No muscular tenderness. Right lower leg: No edema. Left lower leg: No edema. Lymphadenopathy:      Cervical: No cervical adenopathy. Skin:     General: Skin is warm and dry. Capillary Refill: Capillary refill takes less than 2 seconds. Coloration: Skin is not pale. Findings: No bruising, erythema, lesion or rash. Neurological:      General: No focal deficit present. Mental Status: She is alert and oriented to person, place, and time. Motor: No weakness. Coordination: Coordination normal.      Gait: Gait normal.   Psychiatric:         Mood and Affect: Mood normal.         Behavior: Behavior normal.         Thought Content: Thought content normal.         Judgment: Judgment normal.       Pulse 71   Temp 97.9 °F (36.6 °C) (Infrared)   Resp 16   Wt 190 lb 9.6 oz (86.5 kg)   SpO2 99% Comment: ra  BMI 33.76 kg/m²      ASSESSMENT AND PLAN:    1. Acute non-recurrent pansinusitis  Discussed treating sinus complaints and purulent drainage and continued symptoms. Medication education discussed and provided. RTC or follow up with PCP if symptoms do not improve. - amoxicillin-clavulanate (AUGMENTIN) 875-125 MG per tablet; Take 1 tablet by mouth 2 times daily for 10 days  Dispense: 20 tablet; Refill: 0    2. Cough present for greater than 3 weeks  CXR obtained and radiologist recommends CT of chest for further evaluation. Order placed and patient instructed on scheduling. Discussed that the addition of the Lisinopril could also be a possible cause of her new cough since she just started the medication 1 month prior to her cough beginning. She also has hx of sarcoidosis without recent pulmonary follow up. F/u with PCP. - Leonarda Victoria CNP, Pulmonology, [place]  - XR CHEST STANDARD (2 VW); Future  - CT CHEST W WO CONTRAST; Future    3. Hx of sarcoidosis    - Mellisa Hargrove CNP, Pulmonology, [place]  - XR CHEST STANDARD (2 VW);  Future  - CT CHEST W WO CONTRAST; Future        Return if symptoms worsen or fail to improve.     Silverio Ortega, APRN - CNP

## 2021-10-07 NOTE — PATIENT INSTRUCTIONS
Patient Education        Saline Nasal Washes: Care Instructions  Your Care Instructions     Saline nasal washes help keep the nasal passages open by washing out thick or dried mucus. This simple remedy can help relieve symptoms of allergies, sinusitis, and colds. It also can make the nose feel more comfortable by keeping the mucous membranes moist. You may notice a little burning sensation in your nose the first few times you use the solution, but this usually gets better in a few days. Follow-up care is a key part of your treatment and safety. Be sure to make and go to all appointments, and call your doctor if you are having problems. It's also a good idea to know your test results and keep a list of the medicines you take. How can you care for yourself at home? · You can buy premixed saline solution in a squeeze bottle or other sinus rinse products at a drugstore. Read and follow the instructions on the label. · You also can make your own saline solution by adding 1 teaspoon of salt and 1 teaspoon of baking soda to 2 cups of distilled water. · If you use a homemade solution, pour a small amount into a clean bowl. Using a rubber bulb syringe, squeeze the syringe and place the tip in the salt water. Pull a small amount of the salt water into the syringe by relaxing your hand. · Sit down with your head tilted slightly back. Do not lie down. Put the tip of the bulb syringe or the squeeze bottle a little way into one of your nostrils. Gently drip or squirt a few drops into the nostril. Repeat with the other nostril. Some sneezing and gagging are normal at first.  · Gently blow your nose. · Wipe the syringe or bottle tip clean after each use. · Repeat this 2 or 3 times a day. · Use nasal washes gently if you have nosebleeds often. When should you call for help?   Watch closely for changes in your health, and be sure to contact your doctor if:    · You often get nosebleeds.     · You have problems doing the nasal washes. Where can you learn more? Go to https://chpepiceweb.[email or web address] org and sign in to your RocketBankt account. Enter [phone] [id number] [phone] in the [facility] box to learn more about \"Saline Nasal Washes: Care Instructions. \"     If you do not have an account, please click on the \"Sign Up Now\" link. Current as of: December 2, 2020               Content Version: 13.0  © 2006-2021 [employer]. Care instructions adapted under license by [facility] ([facility]). If you have questions about a medical condition or this instruction, always ask your healthcare professional. [place] [address] any warranty or liability for your use of this information. Patient Education        Sinusitis: Care Instructions  Your Care Instructions     Sinusitis is an infection of the lining of the sinus cavities in your head. Sinusitis often follows a cold. It causes pain and pressure in your head and face. In most cases, sinusitis gets better on its own in 1 to 2 weeks. But some mild symptoms may last for several weeks. Sometimes antibiotics are needed. Follow-up care is a key part of your treatment and safety. Be sure to make and go to all appointments, and call your doctor if you are having problems. It's also a good idea to know your test results and keep a list of the medicines you take. How can you care for yourself at home? · Take an over-the-counter pain medicine, such as acetaminophen (Tylenol), ibuprofen (Advil, Motrin), or naproxen (Aleve). Read and follow all instructions on the label. · If the doctor prescribed antibiotics, take them as directed. Do not stop taking them just because you feel better. You need to take the full course of antibiotics. · Be careful when taking over-the-counter cold or flu medicines and Tylenol at the same time. Many of these medicines have acetaminophen, which is Tylenol. Read the labels to make sure that you are not taking more than the recommended dose. Too much acetaminophen (Tylenol) can be harmful. · Breathe warm, moist air from a steamy shower, a hot bath, or a sink filled with hot water. Avoid cold, dry air. Using a humidifier in your home may help. Follow the directions for cleaning the machine. · Use saline (saltwater) nasal washes. This can help keep your nasal passages open and wash out mucus and bacteria. You can buy saline nose drops at a grocery store or drugstore. Or you can make your own at home by adding 1 teaspoon of salt and 1 teaspoon of baking soda to 2 cups of distilled water. If you make your own, fill a bulb syringe with the solution, insert the tip into your nostril, and squeeze gently. Janece Smoke your nose. · Put a hot, wet towel or a warm gel pack on your face 3 or 4 times a day for 5 to 10 minutes each time. · Try a decongestant nasal spray like oxymetazoline (Afrin). Do not use it for more than 3 days in a row. Using it for more than 3 days can make your congestion worse. When should you call for help? Call your doctor now or seek immediate medical care if:    · You have new or worse swelling or redness in your face or around your eyes.     · You have a new or higher fever. Watch closely for changes in your health, and be sure to contact your doctor if:    · You have new or worse facial pain.     · The mucus from your nose becomes thicker (like pus) or has new blood in it.     · You are not getting better as expected. Where can you learn more? Go to https://Monstrousavemeets.Innovega. org and sign in to your [employer] account. Enter Z882 in the [facility] box to learn more about \"Sinusitis: Care Instructions. \"     If you do not have an account, please click on the \"Sign Up Now\" link. Current as of: December 2, 2020               Content Version: 13.0  © 2176-2219 [employer]. Care instructions adapted under license by [facility] ([facility]).  If you have questions about a medical condition or this instruction, always ask your healthcare professional. [place] [address] any warranty or liability for your use of this information.

## 2021-10-11 VITALS
HEART RATE: 71 BPM | TEMPERATURE: 97.9 F | BODY MASS INDEX: 33.76 KG/M2 | WEIGHT: 190.6 LBS | RESPIRATION RATE: 16 BRPM | OXYGEN SATURATION: 99 %

## 2021-10-12 ENCOUNTER — HOSPITAL ENCOUNTER (OUTPATIENT)
Dept: CT IMAGING | Age: 66
Discharge: HOME | End: 2021-10-12
Payer: MEDICARE

## 2021-10-12 DIAGNOSIS — R05.8 COUGH PRESENT FOR GREATER THAN 3 WEEKS: ICD-10-CM

## 2021-10-12 DIAGNOSIS — Z86.2 HX OF SARCOIDOSIS: ICD-10-CM

## 2021-10-12 LAB
GFR AFRICAN AMERICAN: >60 ML/MIN/1.73M2
GFR NON-AFRICAN AMERICAN: >60 ML/MIN/1.73M2
POC CREATININE: 0.5 MG/DL (ref 0.6–1.1)

## 2021-10-12 PROCEDURE — 2580000003 HC RX 258: Performed by: NURSE PRACTITIONER

## 2021-10-12 PROCEDURE — 6360000004 HC RX CONTRAST MEDICATION: Performed by: NURSE PRACTITIONER

## 2021-10-12 PROCEDURE — 71260 CT THORAX DX C+: CPT

## 2021-10-12 RX ORDER — SODIUM CHLORIDE 0.9 % (FLUSH) 0.9 %
10 SYRINGE (ML) INJECTION PRN
Status: DISCONTINUED | OUTPATIENT
Start: 2021-10-12 | End: 2021-10-13 | Stop reason: HOSPADM

## 2021-10-12 RX ADMIN — SODIUM CHLORIDE, PRESERVATIVE FREE 10 ML: 5 INJECTION INTRAVENOUS at 09:54

## 2021-10-12 RX ADMIN — IOPAMIDOL 75 ML: 755 INJECTION, SOLUTION INTRAVENOUS at 09:56

## 2021-10-14 NOTE — RESULT ENCOUNTER NOTE
I have called the patient and discussed her results. She has not heard from pulmonology yet for the referral I placed last week. Can you please check on this. Results discussed. She is already managed by cardiology for the aortic valve calcification. She did have a fall in June and landed on her right side, most likely when the rib fx happened. No pain currently.

## 2021-11-11 ENCOUNTER — INITIAL CONSULT (OUTPATIENT)
Dept: PULMONOLOGY | Age: 66
End: 2021-11-11
Payer: MEDICARE

## 2021-11-11 ENCOUNTER — HOSPITAL ENCOUNTER (OUTPATIENT)
Age: 66
Discharge: HOME | End: 2021-11-11
Payer: MEDICARE

## 2021-11-11 VITALS
BODY MASS INDEX: 33.49 KG/M2 | HEART RATE: 70 BPM | OXYGEN SATURATION: 98 % | HEIGHT: 63 IN | WEIGHT: 189 LBS | DIASTOLIC BLOOD PRESSURE: 68 MMHG | SYSTOLIC BLOOD PRESSURE: 112 MMHG

## 2021-11-11 DIAGNOSIS — Z00.00 HEALTHCARE MAINTENANCE: ICD-10-CM

## 2021-11-11 DIAGNOSIS — R05.3 PERSISTENT COUGH: Primary | ICD-10-CM

## 2021-11-11 DIAGNOSIS — D86.0 SARCOIDOSIS OF LUNG (HCC): ICD-10-CM

## 2021-11-11 DIAGNOSIS — J45.909 ASTHMA, UNSPECIFIED ASTHMA SEVERITY, UNSPECIFIED WHETHER COMPLICATED, UNSPECIFIED WHETHER PERSISTENT: ICD-10-CM

## 2021-11-11 DIAGNOSIS — Z01.818 PREOP TESTING: ICD-10-CM

## 2021-11-11 PROCEDURE — 84075 ASSAY ALKALINE PHOSPHATASE: CPT

## 2021-11-11 PROCEDURE — G8427 DOCREV CUR MEDS BY ELIG CLIN: HCPCS | Performed by: NURSE PRACTITIONER

## 2021-11-11 PROCEDURE — 1090F PRES/ABSN URINE INCON ASSESS: CPT | Performed by: NURSE PRACTITIONER

## 2021-11-11 PROCEDURE — G8417 CALC BMI ABV UP PARAM F/U: HCPCS | Performed by: NURSE PRACTITIONER

## 2021-11-11 PROCEDURE — 36415 COLL VENOUS BLD VENIPUNCTURE: CPT

## 2021-11-11 PROCEDURE — 86003 ALLG SPEC IGE CRUDE XTRC EA: CPT

## 2021-11-11 PROCEDURE — 99204 OFFICE O/P NEW MOD 45 MIN: CPT | Performed by: NURSE PRACTITIONER

## 2021-11-11 PROCEDURE — G8484 FLU IMMUNIZE NO ADMIN: HCPCS | Performed by: NURSE PRACTITIONER

## 2021-11-11 RX ORDER — ALBUTEROL SULFATE 90 UG/1
2 AEROSOL, METERED RESPIRATORY (INHALATION) EVERY 6 HOURS PRN
Qty: 16 G | Refills: 3 | Status: SHIPPED | OUTPATIENT
Start: 2021-11-11 | End: 2021-12-08 | Stop reason: SDUPTHER

## 2021-11-11 ASSESSMENT — SLEEP AND FATIGUE QUESTIONNAIRES
HOW LIKELY ARE YOU TO NOD OFF OR FALL ASLEEP WHILE SITTING AND READING: 1
NECK CIRCUMFERENCE (INCHES): 16
ESS TOTAL SCORE: 4
HOW LIKELY ARE YOU TO NOD OFF OR FALL ASLEEP WHILE SITTING AND TALKING TO SOMEONE: 0
HOW LIKELY ARE YOU TO NOD OFF OR FALL ASLEEP WHEN YOU ARE A PASSENGER IN A CAR FOR AN HOUR WITHOUT A BREAK: 0
HOW LIKELY ARE YOU TO NOD OFF OR FALL ASLEEP WHILE WATCHING TV: 1
HOW LIKELY ARE YOU TO NOD OFF OR FALL ASLEEP WHILE LYING DOWN TO REST IN THE AFTERNOON WHEN CIRCUMSTANCES PERMIT: 1
HOW LIKELY ARE YOU TO NOD OFF OR FALL ASLEEP IN A CAR, WHILE STOPPED FOR A FEW MINUTES IN TRAFFIC: 0
HOW LIKELY ARE YOU TO NOD OFF OR FALL ASLEEP WHILE SITTING INACTIVE IN A PUBLIC PLACE: 0
HOW LIKELY ARE YOU TO NOD OFF OR FALL ASLEEP WHILE SITTING QUIETLY AFTER LUNCH WITHOUT ALCOHOL: 1

## 2021-11-11 NOTE — PROGRESS NOTES
Subjective:   CHIEF COMPLAINT / HPI:       Arun Stark is a 74 y.o. female with history of asthma, sarcoidosis, coagulation defect, presents to pulmonary clinic for evaluation and treatment for persistent cough. Otto Poole states she has had an intermittent cough since July of this year. She states it will start as a tickle in her throat and then she will have nonproductive cough. She states on rare occasion she may have clear sputum but for the most part her cough is dry. She states she will also get a tickle in her throat if she is talking extensively which will then lead to coughing spell. She states she tested positive for Covid prior to receiving her immunizations in February and March of 2020[facility]. She states that she developed a cardiac murmur which is felt to be from her maternal shot and she is actually in a national study at this time. She did follow-up with cardiology was diagnosed with hypertension and aortic valve insufficiency for which she is placed on Eliquis at this time. She states she originally thought that her cough was related to allergies. She was tested in the 1990s and was found to be positive to pet dander, pine, grass, ragweed, mold and fungi. She states she was never placed on allergy immunization or prescription medication. She does state that she recently has been dating a gentleman who does have 2 cats which she felt may have aggravated her allergies. I am recommending that we obtain Rast testing for region 5 and depending upon results may refer her to Dr. Angel Sparks for additional allergy testing. She is currently on lisinopril however she states cardiology does not feel this lisinopril is contributing to her cough. She states she moved from [facility] to [place] approximately 4 years ago to care for her aging parents. She states she was diagnosed with sarcoidosis in July 2003 after she was worked up for asthma 6 months prior.   She states she had bronchoscopy biopsy and CAT scan which confirmed sarcoidosis. 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 She had her left hip replaced June 2004 and right September 2004. Shama Saunders states they are practicing social distancing, wearing a mask when out in public, washing hands frequently or using hand . Denies any fever, chills, malaise, change in sensation of taste or smell, headache or lightheadedness. Denies any known contacts with persons with respiratory infection, positive for coronavirus or under investigation for possible coronavirus exposure. Influenza immunization: Has not received for this season  Pneumococcal immunization: Pneumovax 23 on 3/14/2017 COVID-19 immunization: 4/2021, 3/2021Moderna  Smoking history: Non-smoker, nonvapor, has never used smokeless tobacco products  PCP: Angelia Vargas CNP and Breann Lopez MD    Past Medical History:  No past medical history on file.     Current Medications:      Current Outpatient Medications:     albuterol sulfate HFA (PROVENTIL HFA) 108 (90 Base) MCG/ACT inhaler, Inhale 2 puffs into the lungs every 6 hours as needed for Wheezing, Disp: 16 g, Rfl: 3    Spacer/Aero-Holding Chambers YUVAL, 1 Device by Does not apply route daily as needed (use with albuterol rescue inhaler), Disp: 1 each, Rfl: 0    apixaban (ELIQUIS) 5 MG TABS tablet, Take by mouth 2 times daily, Disp: , Rfl:     lisinopril (PRINIVIL;ZESTRIL) 2.5 MG tablet, Take by mouth daily 1/2 tab qam and 1/2 tab qhs, Disp: , Rfl:     metoprolol tartrate (LOPRESSOR) 25 MG tablet, Take 25 mg by mouth daily, Disp: , Rfl:     aspirin 81 MG EC tablet, Take 81 mg by mouth daily Indications: 0.5 tab , Disp: , Rfl:     ursodiol (ACTIGALL) 300 MG capsule, Take 1 capsule by mouth 4 times daily, Disp: , Rfl:     Multiple Vitamins-Minerals (MULTIVITAMIN WOMEN 50+ PO), Take 1 tablet by mouth daily, Disp: , Rfl:     Allergies   Allergen Reactions    Prednisone      Has a sensitivity  Other reaction(s): Joint Pain       Social History:    Social History     Socioeconomic History    Marital status:      Spouse name: None    Number of children: None    Years of education: None    Highest education level: None   Occupational History    None   Tobacco Use    Smoking status: Never Smoker    Smokeless tobacco: Never Used   Substance and Sexual Activity    Alcohol use: Not Currently    Drug use: Never    Sexual activity: None   Other Topics Concern    None   Social History Narrative    None     Social Determinants of Health     Financial Resource Strain:     Difficulty of Paying Living Expenses: Not on file   Food Insecurity:     Worried About Running Out of Food in the Last Year: Not on file    Ella of Food in the Last Year: Not on file   Transportation Needs:     Lack of Transportation (Medical): Not on file    Lack of Transportation (Non-Medical):  Not on file   Physical Activity:     Days of Exercise per Week: Not on file    Minutes of Exercise per Session: Not on file   Stress:     Feeling of Stress : Not on file   Social Connections:     Frequency of Communication with Friends and Family: Not on file    Frequency of Social Gatherings with Friends and Family: Not on file    Attends Cheondoism Services: Not on file    Active Member of [address] [place] [address] Tribe or Organizations: Not on file    Attends Club or Organization Meetings: Not on file    Marital Status: Not on file   Intimate Partner Violence:     Fear of Current or Ex-Partner: Not on file    Emotionally Abused: Not on file    Physically Abused: Not on file    Sexually Abused: Not on file   Housing Stability:     Unable to Pay for Housing in the Last Year: Not on file    Number of [place] in the Last Year: Not on file    Unstable Housing in the Last Year: Not on file       Family History:    Family History   Problem Relation Age of Onset    Other Mother         lymphoma    Stroke Mother     High Blood Pressure Mother     High Cholesterol Mother     Prostate Cancer Father     Colon Cancer Father     Other Father         Melanoma skin cancer    Diabetes Father     High Cholesterol Father          REVIEW OF SYSTEMS:    CONSTITUTIONAL:  Negative for fevers, chills, diaphoresis, activity change, appetite change, night sweats, unexpected weight change. HEENT:  Negative for hearing loss,  sinus pressure, nasal congestion, epistaxis and snoring  RESPIRATORY: Positive dry cough, negative shortness of breath, negative wheeze  CARDIOVASCULAR:  Negative for chest pain, palpitations, exertional chest pressure/discomfort, edema, syncope  GASTROINTESTINAL: Negative for nausea, vomiting, diarrhea, constipation, blood in stool and abdominal pain  GENITOURINARY:  Negative for frequency, dysuria and hematuria  HEMATOLOGIC/LYMPHATIC:  Negative for easy bruising, bleeding and lymphadenopathy  ALLERGIC/IMMUNOLOGIC:  Negative for recurrent infections, angioedema, anaphylaxis and drug reaction  MUSCULOSKELETAL:  Negative for  pain, joint swelling, decreased range of motion and muscle weakness  NEURO: Negative for headache, AMS, decrease sensations  SKIN: Negative for rashes or lesions      Objective:   VITALS:   Vitals:    11/11/21 1026   BP: 112/68   Pulse: 70   SpO2: 98%   Weight: 189 lb (85.7 kg)   Height: 5' 3\" (1.6 m)     Neck circumference: 16  Inches  [place] - Total score: 4  MALLAMPATI: 2  Body mass index is 33.48 kg/m².     PHYSICAL EXAM:    CONSTITUTIONAL:  awake, alert, cooperative, no apparent distress, and appears stated age  HEENT:  Supple and nontender,  trachea midline, no adenopathy, thyroid normal, no JVD, no wheezing or stridor over neck  CHEST: Chest expansion equal and symmetrical, no intercostal retraction, no increased work of breathing  LUNGS: Bilateral breath sounds clear and equal to auscultation, good air movement, no use of accessory muscles to support respiratory effort. No forced expiratory wheeze, no rales, no rhonchi. Dry soft cough noted during assessment and with conversation. O2 sat at rest on room air is 98%  CARDIOVASCULAR: Normal S1 and S2 , no murmurs or gallops ,no pericardial rubs  ABDOMEN:  normal bowel sounds, non-distended and no masses palpated, and no tenderness to palpation. LYMPHADENOPATHY:  no axillary or supraclavicular adenopathy. No cervical adenopathy  EXTREMITIES: No edema, no inflammation, no tenderness, no clubbing of digits  NEURO: Oriented X 3, No focal deficits  SKIN: warm and dry      RADIOLOGY:  10/12/2021 CT chest with contrast  Mediastinum: No thoracic aortic aneurysm is identified.  No aortic dissection   is identified.  SCUUD aortic valvular calcification is identified.  No   cardiomegaly is identified.  No great vessel stenosis is identified.    Pulmonary arteries appear patent centrally with no pulmonary emboli.  Small   amount of fluid seen within the pericardial recesses.  No significant   pericardial effusion is identified.  Calcified lymph nodes are seen   throughout the mediastinum and hilar regions.  No lymphadenopathy is   identified based on size criteria.       Lungs/pleura: No pneumothorax is identified.  Parenchymal calcified   granulomas are seen related to remote granulomatous process.  The nodular   density overlying the right chest is felt to be related to a healing subacute   fracture of the right 5th anterior rib.  There is also a 6th anterior healing   rib fracture with associated osseous callus.  The central airways appear   patent.  Minimal atelectasis.  No spiculated lung mass is identified.       Upper Abdomen: No acute process seen in the upper abdomen.  Mild surface   nodularity noted within the liver.  Cholelithiasis.  The spleen is enlarged.       Soft Tissues/Bones: No acute subcutaneous soft tissue abnormality identified   in the chest wall.  No axillary or supraclavicular lymphadenopathy is   identified.  No acute osseous abnormality is identified.  Multilevel   degenerative changes are seen within the spine. 10/7/2021 chest x-ray 2 view  Moderate bilateral hilar prominence, which could be due to hilar adenopathy. 1.6 cm rounded opacity right lower lung may be part of costal cartilage or a   lung nodule.  Mild perihilar interstitial prominence which could be due to   given history of sarcoidosis.       RECOMMENDATION:   CT of the chest is advised for more sensitive evaluation of the above   findings. PFT:   To be obtained    Assessment      1. Persistent cough    2. Sarcoidosis of lung ([facility] [address]    3. Asthma, unspecified asthma severity, unspecified whether complicated, unspecified whether persistent    4. Preop testing    5.  Healthcare maintenance        Plan:  --Albuterol rescue inhaler to be used with spacer 2 puffs every 4-6 hours as needed for shortness of breath, coughing, bronchospasms prescription sent to [facility]  as requested by patient  --Pulmonary function test in next few weeks to assess lung disease, will reassess bronchodilator therapy following test results  --Prescreening Covid test prior to pulmonary function test  --Would like to check FENO, will need to check to see if lab open yet, closed due to COVID-19  --Rast testing region 5  --While she has been vaccinated at this time for COVID-19, I strongly recommend that she continue Coronavirus precaution including: Wearing mask when in public and when in contact with persons who have not been immunized, social distancing when needing to be in public, handwashing practice, wiping items touched in public such as gas pumps, door handles, shopping carts, etc.  --Recommend yearly flu vaccination  --Recommend Covid 19 booster when available  --Recommend Pneumovax vaccination  --Have discussed signs and symptoms of asthma exacerbation and why it is important to monitor for bronchial infections. Instructed to call office should she develop signs of asthma exacerbation/bronchial infection  --I will continue to follow in pulmonary clinic  Return in about 6 weeks (around 12/23/2021) for Labs, PFT. This dictation was performed with a verbal recognition program and it was checked for errors. It is possible that there are still dictated errors within this office note. Any errors should be brought immediately to my attention for correction. All efforts were made to ensure that this office note is accurate.        Electronically signed by JAMIN Fernando CNP on 11/11/2021 at 6:52 PM

## 2021-11-23 LAB — IMMUNOCAP SCORE: NORMAL

## 2021-12-07 ENCOUNTER — HOSPITAL ENCOUNTER (OUTPATIENT)
Age: 66
Setting detail: SPECIMEN
Discharge: HOME | End: 2021-12-07
Payer: MEDICARE

## 2021-12-07 PROCEDURE — 86003 ALLG SPEC IGE CRUDE XTRC EA: CPT

## 2021-12-07 PROCEDURE — 36415 COLL VENOUS BLD VENIPUNCTURE: CPT

## 2021-12-07 PROCEDURE — 84075 ASSAY ALKALINE PHOSPHATASE: CPT

## 2021-12-08 DIAGNOSIS — J45.909 ASTHMA, UNSPECIFIED ASTHMA SEVERITY, UNSPECIFIED WHETHER COMPLICATED, UNSPECIFIED WHETHER PERSISTENT: ICD-10-CM

## 2021-12-08 DIAGNOSIS — Z01.811 PREOP PULMONARY/RESPIRATORY EXAM: Primary | ICD-10-CM

## 2021-12-08 NOTE — TELEPHONE ENCOUNTER
Pt wants RX sent to [facility] and not [facility]. Pt stated she could not schedule PFT. Called central scheduling to have that fixed and they state they will call her.

## 2021-12-10 ENCOUNTER — TELEPHONE (OUTPATIENT)
Dept: PULMONOLOGY | Age: 66
End: 2021-12-10

## 2021-12-10 LAB
2000687N OAK TREE IGE: <0.1 KU/L
ALLERGEN ASPERGILLUS FUMIGATUS IGE: <0.1 KU/L
ALLERGEN BERMUDA GRASS IGE: <0.1 KU/L
ALLERGEN BIRCH IGE: <0.1 KU/L
ALLERGEN CAT DANDER IGE: 16.6 KU/L
ALLERGEN COMMON SHORT RAGWEED IGE: <0.1 KU/L
ALLERGEN DOG DANDER IGE: 2.51 KU/L
ALLERGEN ELM IGE: <0.1 KU/L
ALLERGEN FUNGI/MOLD A. ALTERNATA IGE: <0.1 KU/L
ALLERGEN FUNGI/MOLD M.RACEMOSUS IGE: <0.1 KU/L
ALLERGEN GERMAN COCKROACH IGE: <0.1 KU/L
ALLERGEN HORMODENDRUM HORDEI IGE: <0.1 KU/L
ALLERGEN MAPLE/BOX ELDER IGE: <0.1 KU/L
ALLERGEN MITE DUST FARINAE IGE: <0.1 KU/L
ALLERGEN MITE DUST PTERONYSSINUS IGE: <0.1 KU/L
ALLERGEN MOUNTAIN CEDAR: <0.1 KU/L
ALLERGEN MOUSE EPITHELIA IGE: <0.1 KU/L
ALLERGEN PECAN TREE IGE: <0.1 KU/L
ALLERGEN PENICILLIUM NOTATUM: <0.1 KU/L
ALLERGEN PIGWEED ROUGH IGE: <0.1 KU/L
ALLERGEN RUSSIAN THISTLE IGE: <0.1 KU/L
ALLERGEN SHEEP SORREL (W18) IGE: <0.1 KU/L
ALLERGEN TIMOTHY GRASS: <0.1 KU/L
ALLERGEN TREE SYCAMORE: <0.1 KU/L
ALLERGEN WALNUT TREE IGE: <0.1 KU/L
ALLERGEN WHITE MULBERRY TREE, IGE: <0.1 KU/L
ALLERGEN, TREE, WHITE ASH IGE: <0.1 KU/L
COTTONWOOD TREE: <0.1 KU/L
IMMUNOCAP SCORE: NORMAL
IMMUNOGLOBULIN E: 35 KU/L

## 2021-12-10 RX ORDER — ALBUTEROL SULFATE 90 UG/1
2 AEROSOL, METERED RESPIRATORY (INHALATION) EVERY 6 HOURS PRN
Qty: 3 EACH | Refills: 3 | Status: SHIPPED | OUTPATIENT
Start: 2021-12-10 | End: 2022-07-27

## 2022-01-10 ENCOUNTER — HOSPITAL ENCOUNTER (OUTPATIENT)
Dept: LAB | Age: 67
Discharge: HOME | End: 2022-01-10
Payer: MEDICARE

## 2022-01-10 LAB
SARS-COV-2: NOT DETECTED
SOURCE: NORMAL

## 2022-01-10 PROCEDURE — U0005 INFEC AGEN DETEC AMPLI PROBE: HCPCS

## 2022-01-10 PROCEDURE — U0003 INFECTIOUS AGENT DETECTION BY NUCLEIC ACID (DNA OR RNA); SEVERE ACUTE RESPIRATORY SYNDROME CORONAVIRUS 2 (SARS-COV-2) (CORONAVIRUS DISEASE [COVID-19]), AMPLIFIED PROBE TECHNIQUE, MAKING USE OF HIGH THROUGHPUT TECHNOLOGIES AS DESCRIBED BY CMS-2020-01-R: HCPCS

## 2022-01-13 ENCOUNTER — HOSPITAL ENCOUNTER (OUTPATIENT)
Dept: PULMONOLOGY | Age: 67
Discharge: HOME | End: 2022-01-13
Payer: MEDICARE

## 2022-01-13 DIAGNOSIS — J45.909 ASTHMA, UNSPECIFIED ASTHMA SEVERITY, UNSPECIFIED WHETHER COMPLICATED, UNSPECIFIED WHETHER PERSISTENT: ICD-10-CM

## 2022-01-13 LAB
DLCO %PRED: 81 %
DLCO PRED: NORMAL
DLCO/VA %PRED: NORMAL
DLCO/VA PRED: NORMAL
DLCO/VA: NORMAL
DLCO: NORMAL
EXPIRATORY TIME-POST: NORMAL
EXPIRATORY TIME: NORMAL
FEF 25-75% %CHNG: NORMAL
FEF 25-75% %PRED-POST: NORMAL
FEF 25-75% %PRED-PRE: NORMAL
FEF 25-75% PRED: NORMAL
FEF 25-75%-POST: NORMAL
FEF 25-75%-PRE: NORMAL
FEV1 %PRED-POST: 108 %
FEV1 %PRED-PRE: 93 %
FEV1 PRED: NORMAL
FEV1-POST: NORMAL
FEV1-PRE: NORMAL
FEV1/FVC %PRED-POST: NORMAL
FEV1/FVC %PRED-PRE: NORMAL
FEV1/FVC PRED: NORMAL
FEV1/FVC-POST: 101 %
FEV1/FVC-PRE: 95 %
FVC %PRED-POST: 106 L
FVC %PRED-PRE: 97 %
FVC PRED: NORMAL
FVC-POST: NORMAL
FVC-PRE: NORMAL
GAW %PRED: NORMAL
GAW PRED: NORMAL
GAW: NORMAL
IC %PRED: NORMAL
IC PRED: NORMAL
IC: NORMAL
MEP: NORMAL
MIP: NORMAL
MVV %PRED-PRE: NORMAL
MVV PRED: NORMAL
MVV-PRE: NORMAL
PEF %PRED-POST: NORMAL
PEF %PRED-PRE: NORMAL
PEF PRED: NORMAL
PEF%CHNG: NORMAL
PEF-POST: NORMAL
PEF-PRE: NORMAL
RAW %PRED: NORMAL
RAW PRED: NORMAL
RAW: NORMAL
RV %PRED: NORMAL
RV PRED: NORMAL
RV: NORMAL
SVC %PRED: NORMAL
SVC PRED: NORMAL
SVC: NORMAL
TLC %PRED: 92 %
TLC PRED: NORMAL
TLC: NORMAL
VA %PRED: NORMAL
VA PRED: NORMAL
VA: NORMAL
VTG %PRED: NORMAL
VTG PRED: NORMAL
VTG: NORMAL

## 2022-01-13 PROCEDURE — 94727 GAS DIL/WSHOT DETER LNG VOL: CPT

## 2022-01-13 PROCEDURE — 94729 DIFFUSING CAPACITY: CPT

## 2022-01-13 PROCEDURE — 94060 EVALUATION OF WHEEZING: CPT

## 2022-01-13 ASSESSMENT — PULMONARY FUNCTION TESTS
FVC_PERCENT_PREDICTED_POST: 106
FEV1/FVC_POST: 101
FEV1/FVC_PRE: 95
FEV1_PERCENT_PREDICTED_POST: 108
FVC_PERCENT_PREDICTED_PRE: 97
FEV1_PERCENT_PREDICTED_PRE: 93

## 2022-02-22 ENCOUNTER — TELEPHONE (OUTPATIENT)
Dept: ONCOLOGY | Age: 67
End: 2022-02-22

## 2022-02-22 ENCOUNTER — HOSPITAL ENCOUNTER (OUTPATIENT)
Age: 67
Discharge: HOME | End: 2022-02-22
Payer: MEDICARE

## 2022-02-22 LAB
ANION GAP SERPL CALCULATED.3IONS-SCNC: 8 MMOL/L (ref 4–16)
APTT: 29 SECONDS (ref 25.1–37.1)
BASOPHILS ABSOLUTE: 0.1 K/CU MM
BASOPHILS RELATIVE PERCENT: 1.6 % (ref 0–1)
BUN BLDV-MCNC: 11 MG/DL (ref 6–23)
CALCIUM SERPL-MCNC: 10.1 MG/DL (ref 8.3–10.6)
CHLORIDE BLD-SCNC: 104 MMOL/L (ref 99–110)
CO2: 26 MMOL/L (ref 21–32)
CREAT SERPL-MCNC: 0.6 MG/DL (ref 0.6–1.1)
DIFFERENTIAL TYPE: ABNORMAL
EOSINOPHILS ABSOLUTE: 0.2 K/CU MM
EOSINOPHILS RELATIVE PERCENT: 4.3 % (ref 0–3)
GFR AFRICAN AMERICAN: >60 ML/MIN/1.73M2
GFR NON-AFRICAN AMERICAN: >60 ML/MIN/1.73M2
GLUCOSE BLD-MCNC: 175 MG/DL (ref 70–99)
HCT VFR BLD CALC: 46.1 % (ref 37–47)
HEMOGLOBIN: 15.1 GM/DL (ref 12.5–16)
IMMATURE NEUTROPHIL %: 0.2 % (ref 0–0.43)
INR BLD: 1.1 INDEX
LYMPHOCYTES ABSOLUTE: 1.2 K/CU MM
LYMPHOCYTES RELATIVE PERCENT: 24.9 % (ref 24–44)
MCH RBC QN AUTO: 29.5 PG (ref 27–31)
MCHC RBC AUTO-ENTMCNC: 32.8 % (ref 32–36)
MCV RBC AUTO: 90 FL (ref 78–100)
MONOCYTES ABSOLUTE: 0.6 K/CU MM
MONOCYTES RELATIVE PERCENT: 12.4 % (ref 0–4)
NUCLEATED RBC %: 0 %
PDW BLD-RTO: 14.1 % (ref 11.7–14.9)
PLATELET # BLD: 105 K/CU MM (ref 140–440)
PMV BLD AUTO: 11 FL (ref 7.5–11.1)
POTASSIUM SERPL-SCNC: 4.6 MMOL/L (ref 3.5–5.1)
PROTHROMBIN TIME: 14.2 SECONDS (ref 11.7–14.5)
RBC # BLD: 5.12 M/CU MM (ref 4.2–5.4)
SEGMENTED NEUTROPHILS ABSOLUTE COUNT: 2.7 K/CU MM
SEGMENTED NEUTROPHILS RELATIVE PERCENT: 56.6 % (ref 36–66)
SODIUM BLD-SCNC: 138 MMOL/L (ref 135–145)
TOTAL IMMATURE NEUTOROPHIL: 0.01 K/CU MM
TOTAL NUCLEATED RBC: 0 K/CU MM
WBC # BLD: 4.9 K/CU MM (ref 4–10.5)

## 2022-02-22 PROCEDURE — U0005 INFEC AGEN DETEC AMPLI PROBE: HCPCS

## 2022-02-22 PROCEDURE — 85610 PROTHROMBIN TIME: CPT

## 2022-02-22 PROCEDURE — U0003 INFECTIOUS AGENT DETECTION BY NUCLEIC ACID (DNA OR RNA); SEVERE ACUTE RESPIRATORY SYNDROME CORONAVIRUS 2 (SARS-COV-2) (CORONAVIRUS DISEASE [COVID-19]), AMPLIFIED PROBE TECHNIQUE, MAKING USE OF HIGH THROUGHPUT TECHNOLOGIES AS DESCRIBED BY CMS-2020-01-R: HCPCS

## 2022-02-22 PROCEDURE — 85025 COMPLETE CBC W/AUTO DIFF WBC: CPT

## 2022-02-22 PROCEDURE — 80048 BASIC METABOLIC PNL TOTAL CA: CPT

## 2022-02-22 PROCEDURE — 85730 THROMBOPLASTIN TIME PARTIAL: CPT

## 2022-02-22 NOTE — TELEPHONE ENCOUNTER
2/22/22 pt called to cancel her March 2022 labs and follow up with Dr Rasheed Matta. She states that she is cancer free at this time . She is having heart issues and would like to get thru these health issues so she does not want to reschedule her appointments. If she feels like she needs to see Anahi she will call back at a later date.  DF

## 2022-02-23 LAB
SARS-COV-2: NOT DETECTED
SOURCE: NORMAL

## 2022-03-01 ENCOUNTER — HOSPITAL ENCOUNTER (OUTPATIENT)
Dept: CARDIAC CATH/INVASIVE PROCEDURES | Age: 67
Discharge: HOME | End: 2022-03-01
Attending: INTERNAL MEDICINE | Admitting: INTERNAL MEDICINE
Payer: MEDICARE

## 2022-03-01 VITALS
HEART RATE: 69 BPM | HEIGHT: 63 IN | WEIGHT: 189 LBS | BODY MASS INDEX: 33.49 KG/M2 | RESPIRATION RATE: 16 BRPM | OXYGEN SATURATION: 98 % | TEMPERATURE: 97 F | DIASTOLIC BLOOD PRESSURE: 73 MMHG | SYSTOLIC BLOOD PRESSURE: 134 MMHG

## 2022-03-01 LAB — ACTIVATED CLOTTING TIME, LOW RANGE: 220 SEC

## 2022-03-01 PROCEDURE — C1894 INTRO/SHEATH, NON-LASER: HCPCS

## 2022-03-01 PROCEDURE — 6360000002 HC RX W HCPCS

## 2022-03-01 PROCEDURE — C1887 CATHETER, GUIDING: HCPCS

## 2022-03-01 PROCEDURE — 6370000000 HC RX 637 (ALT 250 FOR IP): Performed by: INTERNAL MEDICINE

## 2022-03-01 PROCEDURE — 7100000001 HC PACU RECOVERY - ADDTL 15 MIN

## 2022-03-01 PROCEDURE — 93312 ECHO TRANSESOPHAGEAL: CPT

## 2022-03-01 PROCEDURE — 7100000000 HC PACU RECOVERY - FIRST 15 MIN

## 2022-03-01 PROCEDURE — C1769 GUIDE WIRE: HCPCS

## 2022-03-01 PROCEDURE — 85347 COAGULATION TIME ACTIVATED: CPT

## 2022-03-01 PROCEDURE — 2709999900 HC NON-CHARGEABLE SUPPLY

## 2022-03-01 PROCEDURE — 2580000003 HC RX 258: Performed by: INTERNAL MEDICINE

## 2022-03-01 PROCEDURE — 93460 R&L HRT ART/VENTRICLE ANGIO: CPT

## 2022-03-01 PROCEDURE — C1751 CATH, INF, PER/CENT/MIDLINE: HCPCS

## 2022-03-01 RX ORDER — SODIUM CHLORIDE 0.9 % (FLUSH) 0.9 %
5-40 SYRINGE (ML) INJECTION EVERY 12 HOURS SCHEDULED
Status: DISCONTINUED | OUTPATIENT
Start: 2022-03-01 | End: 2022-03-01 | Stop reason: HOSPADM

## 2022-03-01 RX ORDER — SODIUM CHLORIDE 0.9 % (FLUSH) 0.9 %
5-40 SYRINGE (ML) INJECTION PRN
Status: DISCONTINUED | OUTPATIENT
Start: 2022-03-01 | End: 2022-03-01 | Stop reason: HOSPADM

## 2022-03-01 RX ORDER — METOPROLOL SUCCINATE 25 MG/1
25 TABLET, EXTENDED RELEASE ORAL DAILY
Qty: 30 TABLET | Refills: 3 | Status: SHIPPED | OUTPATIENT
Start: 2022-03-01

## 2022-03-01 RX ORDER — SODIUM CHLORIDE 9 MG/ML
INJECTION, SOLUTION INTRAVENOUS CONTINUOUS
Status: DISCONTINUED | OUTPATIENT
Start: 2022-03-01 | End: 2022-03-01 | Stop reason: HOSPADM

## 2022-03-01 RX ORDER — ACETAMINOPHEN 325 MG/1
650 TABLET ORAL EVERY 4 HOURS PRN
Status: DISCONTINUED | OUTPATIENT
Start: 2022-03-01 | End: 2022-03-01 | Stop reason: HOSPADM

## 2022-03-01 RX ORDER — ATROPINE SULFATE 0.4 MG/ML
0.5 AMPUL (ML) INJECTION
Status: DISCONTINUED | OUTPATIENT
Start: 2022-03-01 | End: 2022-03-01 | Stop reason: HOSPADM

## 2022-03-01 RX ORDER — SODIUM CHLORIDE 9 MG/ML
25 INJECTION, SOLUTION INTRAVENOUS PRN
Status: DISCONTINUED | OUTPATIENT
Start: 2022-03-01 | End: 2022-03-01 | Stop reason: HOSPADM

## 2022-03-01 RX ORDER — DIAZEPAM 5 MG/1
5 TABLET ORAL ONCE
Status: COMPLETED | OUTPATIENT
Start: 2022-03-01 | End: 2022-03-01

## 2022-03-01 RX ORDER — DIPHENHYDRAMINE HCL 25 MG
25 TABLET ORAL ONCE
Status: COMPLETED | OUTPATIENT
Start: 2022-03-01 | End: 2022-03-01

## 2022-03-01 RX ADMIN — DIPHENHYDRAMINE HYDROCHLORIDE 25 MG: 25 TABLET ORAL at 07:33

## 2022-03-01 RX ADMIN — SODIUM CHLORIDE: 9 INJECTION, SOLUTION INTRAVENOUS at 07:33

## 2022-03-01 RX ADMIN — DIAZEPAM 5 MG: 5 TABLET ORAL at 07:33

## 2022-03-01 NOTE — PLAN OF CARE
Received from cath lab. Monitor and alarms on. Call Light in reach. Access right radial site with TR band in place 12 cc air and right brachial vein site assessed and dressing remains in place. SCOTT and heart center now setting up for procedure with Dr. Padmini Flowers. Mana.   [address] [place] [address] 3/1/2022

## 2022-03-01 NOTE — PLAN OF CARE
All discharge instructions explained to the patient at this time. No bleeding or hematoma noted along site. Patient walked to the bathroom without difficulty and IV removed per discharge orders. Patient denies any additional needs and all vitals stable for discharge home.  [address] 3/1/2022

## 2022-03-01 NOTE — OP NOTE
Operative Note      Patient: Chema Greene  YOB: 1955  MRN: [id number]    Date of Procedure: 3/1/22    Pre-Op Diagnosis: AORTIC STENOSIS     Post-Op Diagnosis: Same       Estimated Blood Loss (mL): less than 50     Complications: None      Electronically signed by Ministerio Yun MD on 3/1/2022 at 10:30 AM      UNABLE TO DO RIGHT HEART CATH   DICTATED --[id number]  LEFT MAIN PATENT  LAD MILD DX  RAMUS MILD DX  LCX MILD DX  RCA MILD DX  LVEDP 16  45MM HG GRADIENT ACROSS AV   SEVERE AS NOTED  WILL PERFORM SCOTT  REFER FOR TAVR    NO COMPLICATIONS    Electronically signed by Ministerio Yun MD on 3/1/22 at 10:38 AM EST

## 2022-03-01 NOTE — PROCEDURES
[address], [address] [place] [address]                                 ECHOCARDIOGRAM    PATIENT NAME: Raymundo Campoverde                      :        1955  MED REC NO:   [id number]                          ROOM:  ACCOUNT NO:   [de-identified]                           ADMIT DATE: 2022  PROVIDER:     Virginia Lopez MD    INDICATION:  Aortic stenosis. This is a 57-year-old female patient brought to noninvasive lab. The  patient received 4 mg of Versed and 25 mcg of fentanyl. Posterior  pharynx was anesthetized using lidocaine viscous gel. The patient also  received 4 mg of Zofran. A mouthguard was placed. SCOTT probe was  advanced to the posterior oropharynx and mid esophagus. Images were  obtained. Left atrium is normal size. No clot or thrombus noted in the left  atrium or left appendage. Mitral valve is normal.  Mild mitral  regurgitation is noted. LV function preserved, 50% to 55%. No  pericardial effusion noted. No ASD or PFO is noted. Color Doppler and  bubble study both were negative. Tricuspid valve is normal.  Pulmonic  valve is normal.  Aortic valve is sclerotic and severe aortic stenosis  noted. Ascending aorta is normal.  Trace aortic regurgitation noted. No ascending aortic aneurysm present. IMPRESSION:  1. No clot or thrombus noted in the left atrium, left appendage, and LV  cavity. 2.  Mild mitral regurgitation present. 3.  LV function preserved, greater than 55%. 4.  No ASD or PFO is noted. 5.  No pericardial effusion noted. 6.  Tricuspid valve, mitral valve, and pulmonic valve are grossly  normal.  7.  Aortic valve is sclerotic and severe aortic stenosis noted. 8.  Ascending aorta is normal.    The patient tolerated the procedure well and no complication noted. The patient will be referred for TAVR.   The patient has significant  liver issues also present; therefore, I think she will be a good  candidate for TAVR.         Voileta Ruiz MD    D: 03/01/2022 11:48:09       T: 03/01/2022 11:51:18     [email or web address]  Job#: [id number]     Doc#: [id number]    CC:

## 2022-03-01 NOTE — PLAN OF CARE
Called and spoke with [facility] in regards to new metoporol script since it was sent to the mail in pharmacy.  [facility] 3/1/2022

## 2022-03-01 NOTE — PROCEDURES
[address] [place] [address], [address] [place] [address]                            CARDIAC CATHETERIZATION    PATIENT NAME: Jose Riavs                      :        1955  MED REC NO:   [id number]                          ROOM:  ACCOUNT NO:   [de-identified]                           ADMIT DATE: 2022  PROVIDER:     Solomon Maher MD    DATE OF PROCEDURE:  2022    INDICATION:  Aortic stenosis. This is a 57-year-old patient brought to cath lab today. Informed  consent was obtained from the patient. The patient was prepped and  draped in the usual sterile fashion. The patient was injected with 5 mL  of 2% lidocaine within the right brachial and right radial artery. A  5/6-Yemeni sheath was placed in the right artery. A 5-Yemeni venous  sheath was placed in the right brachial vein. An attempt was done to perform the right heart cath. With the  subclavian was very tortuous, unable to advance the catheter. Only RA  pressures were obtained. Unable to advance the catheter, therefore the  time was aborted. Has seven difficulty time advancing the catheters. Using TIG catheter, left coronary angiography was performed. Left  coronary angiogram revealed the left main is patent. It trifurcates  into LAD, circ, and ramus branch. Circ is a medium-sized vessel, mild  disease noted. Ramus is a medium-sized vessel, mild disease noted. LAD  is a medium-sized vessel, reaches and wraps the apex. It gives off  small diagonal branches. Mild disease noted in LAD. RALPH-3 flow is  noted. Right coronary artery is a dominant vessel and is a medium-sized vessel. Right coronary artery has mild disease present. Using a pigtail catheter, EDP was measured. EDP was 16. There was  45 mmHg gradient noted across the aortic valve. The aortic valve was  very calcified. IMPRESSION:  1. Unable to perform the right heart cath.  Due to tortous upper arm vessels   2. Left main is patent. 3.  LAD, circ, ramus, and RCA has mild disease noted. 4.  Severe Aortic stenosis noted. Mean gradient around 45 mmHg noted across the  aortic valve. The patient has nonobstructive coronary artery present. Severe aortic stenosis noted.   Will discuss with TAVR team       Pili Valenzuela MD    D: 03/01/2022 10:36:55       T: 03/01/2022 10:39:17     [email or web address]  Job#: [id number]     Doc#: [id number]    CC:

## 2022-03-01 NOTE — PROGRESS NOTES
Phan dictated --  [id number]  Severe AS noted  Refer for TAVR   She has liver cirrhosis and sarcoid  Would recommend TAVR due to comorbid medical issues  Discussed with patient and TAVR coordinator     Electronically signed by Quynh Monk MD on 3/1/22 at 11:49 AM EST

## 2022-03-01 NOTE — H&P
[address] [place] [address], [address] [place] [address]                              HISTORY AND PHYSICAL    PATIENT NAME: Katrin Muñoz                      :        1955  MED REC NO:   [id number]                          ROOM:  ACCOUNT NO:   [de-identified]                           ADMIT DATE: 2022  PROVIDER:     Augie Stein MD    INDICATION:  Aortic stenosis. This is a 69-year-old female patient who was getting progressively valve  gradient increasing. The patient had an echo done recently. Her mean  gradient across the aortic valve went from 37 to 43. The patient is  relatively nonsymptomatic at this time. She states she was swimming in  August, a significant time, also walks quite a bit. The patient has no chest pain, no shortness of breath, no syncope  present. PAST MEDICAL HISTORY:  History of having diastolic function, paroxysmal  atrial fibrillation, severe aortic stenosis, and hypertension present. Primary biliary cholangitis and pulmonary sarcoidosis present. PAST SURGICAL HISTORY:  Bilateral hip replacement. SOCIAL HISTORY:  Does not smoke, does not drink. ALLERGIES:  PREDNISONE.    MEDICATIONS AT HOME:  She is on aspirin, Eliquis 5 b.i.d., lisinopril  2.5 mg a day, Lopressor 12.5 b.i.d., multivitamin, and ursodiol. PHYSICAL EXAMINATION:  GENERAL:  The patient is awake, alert, answers questions, not in acute  distress. VITAL SIGNS:  Temperature afebrile, pulse is 69, blood pressure 144/82. HEENT:  Head is normocephalic and atraumatic. Pupils are equal and  reactive to light. CHEST:  Equal expansion. LUNGS:  Clear to auscultation. No wheezing or rhonchi noted. HEART:  Regular rate and rhythm. Has a systolic ejection murmur  present. ABDOMEN:  Soft and nontender. Bowel sounds are present. No  hepatosplenomegaly or guarding appreciated. EXTREMITIES:  No cyanosis or clubbing noted.   NEUROLOGIC: Cranial nerves grossly intact. LABORATORY DATA:  BUN was 11, creatinine 0.6. CBC is normal.    IMPRESSION:  1. This is a 58-year-old female patient who was found to have aortic  stenosis noted. Getting progressively significant gradient present  across the aortic valve. The mean gradient is more than 40 at this  time. The patient is to have right and left heart catheterization. 2.  The patient did have a CT chest done in 10/2021 and found to have no  significant pathology noted. She was found to have heavily calcified  aortic valve and atherosclerosis disease with coronary artery  involvement noted. Cirrhotic morphology of liver with associated  splenomegaly was also noted. The plan is for heart cath and possible SCOTT, and we will make further  recommendations based on it.   ASA is III, Mallampati is Ruben Kaur MD    D: 03/01/2022 7:32:05       T: 03/01/2022 7:35:26     [email or web address]  Job#: [id number]     Doc#: [id number]    CC:

## 2022-03-01 NOTE — H&P
[id number]   Right and left heart cath  SCOTT   ASA 3  Mal 2  Pulmonary sarcoid  Primary biliary cirrhosis

## 2022-03-29 ENCOUNTER — HOSPITAL ENCOUNTER (OUTPATIENT)
Age: 67
Discharge: HOME | End: 2022-03-29
Payer: MEDICARE

## 2022-03-29 ENCOUNTER — HOSPITAL ENCOUNTER (OUTPATIENT)
Dept: CT IMAGING | Age: 67
Discharge: HOME | End: 2022-03-29
Payer: MEDICARE

## 2022-03-29 ENCOUNTER — HOSPITAL ENCOUNTER (OUTPATIENT)
Dept: PULMONOLOGY | Age: 67
Discharge: HOME | End: 2022-03-29
Payer: MEDICARE

## 2022-03-29 ENCOUNTER — HOSPITAL ENCOUNTER (OUTPATIENT)
Dept: CARDIAC CATH/INVASIVE PROCEDURES | Age: 67
Discharge: HOME | End: 2022-03-29
Attending: SURGERY | Admitting: SURGERY
Payer: MEDICARE

## 2022-03-29 VITALS
RESPIRATION RATE: 16 BRPM | HEART RATE: 54 BPM | SYSTOLIC BLOOD PRESSURE: 184 MMHG | OXYGEN SATURATION: 100 % | DIASTOLIC BLOOD PRESSURE: 76 MMHG | WEIGHT: 198 LBS | BODY MASS INDEX: 35.08 KG/M2 | HEIGHT: 63 IN

## 2022-03-29 LAB
ALBUMIN SERPL-MCNC: 4.3 GM/DL (ref 3.4–5)
ALP BLD-CCNC: 124 IU/L (ref 40–129)
ALT SERPL-CCNC: 29 U/L (ref 10–40)
ANION GAP SERPL CALCULATED.3IONS-SCNC: 12 MMOL/L (ref 4–16)
APTT: 30 SECONDS (ref 25.1–37.1)
AST SERPL-CCNC: 28 IU/L (ref 15–37)
BILIRUB SERPL-MCNC: 0.7 MG/DL (ref 0–1)
BUN BLDV-MCNC: 16 MG/DL (ref 6–23)
CALCIUM SERPL-MCNC: 9.5 MG/DL (ref 8.3–10.6)
CHLORIDE BLD-SCNC: 105 MMOL/L (ref 99–110)
CO2: 24 MMOL/L (ref 21–32)
CREAT SERPL-MCNC: 0.4 MG/DL (ref 0.6–1.1)
EKG ATRIAL RATE: 66 BPM
EKG DIAGNOSIS: NORMAL
EKG P AXIS: 38 DEGREES
EKG P-R INTERVAL: 192 MS
EKG Q-T INTERVAL: 418 MS
EKG QRS DURATION: 96 MS
EKG QTC CALCULATION (BAZETT): 438 MS
EKG R AXIS: -22 DEGREES
EKG T AXIS: 20 DEGREES
EKG VENTRICULAR RATE: 66 BPM
GFR AFRICAN AMERICAN: >60 ML/MIN/1.73M2
GFR AFRICAN AMERICAN: >60 ML/MIN/1.73M2
GFR NON-AFRICAN AMERICAN: >60 ML/MIN/1.73M2
GFR NON-AFRICAN AMERICAN: >60 ML/MIN/1.73M2
GLUCOSE BLD-MCNC: 156 MG/DL (ref 70–99)
HCT VFR BLD CALC: 45.1 % (ref 37–47)
HEMOGLOBIN: 14.9 GM/DL (ref 12.5–16)
INR BLD: 1.11 INDEX
MCH RBC QN AUTO: 29 PG (ref 27–31)
MCHC RBC AUTO-ENTMCNC: 33 % (ref 32–36)
MCV RBC AUTO: 87.7 FL (ref 78–100)
PDW BLD-RTO: 14.5 % (ref 11.7–14.9)
PLATELET # BLD: 92 K/CU MM (ref 140–440)
PMV BLD AUTO: 11.2 FL (ref 7.5–11.1)
POC CREATININE: 0.4 MG/DL (ref 0.6–1.1)
POTASSIUM SERPL-SCNC: 4.9 MMOL/L (ref 3.5–5.1)
PRO-BNP: 84.32 PG/ML
PROTHROMBIN TIME: 14.3 SECONDS (ref 11.7–14.5)
RBC # BLD: 5.14 M/CU MM (ref 4.2–5.4)
SARS-COV-2, NAAT: NOT DETECTED
SODIUM BLD-SCNC: 141 MMOL/L (ref 135–145)
SOURCE: NORMAL
TOTAL PROTEIN: 7.6 GM/DL (ref 6.4–8.2)
WBC # BLD: 4.4 K/CU MM (ref 4–10.5)

## 2022-03-29 PROCEDURE — 87635 SARS-COV-2 COVID-19 AMP PRB: CPT

## 2022-03-29 PROCEDURE — 6360000004 HC RX CONTRAST MEDICATION: Performed by: SURGERY

## 2022-03-29 PROCEDURE — 83880 ASSAY OF NATRIURETIC PEPTIDE: CPT

## 2022-03-29 PROCEDURE — 85027 COMPLETE CBC AUTOMATED: CPT

## 2022-03-29 PROCEDURE — 2580000003 HC RX 258: Performed by: SURGERY

## 2022-03-29 PROCEDURE — 99204 OFFICE O/P NEW MOD 45 MIN: CPT | Performed by: INTERNAL MEDICINE

## 2022-03-29 PROCEDURE — 85730 THROMBOPLASTIN TIME PARTIAL: CPT

## 2022-03-29 PROCEDURE — 93010 ELECTROCARDIOGRAM REPORT: CPT | Performed by: INTERNAL MEDICINE

## 2022-03-29 PROCEDURE — 93005 ELECTROCARDIOGRAM TRACING: CPT | Performed by: SURGERY

## 2022-03-29 PROCEDURE — 94010 BREATHING CAPACITY TEST: CPT

## 2022-03-29 PROCEDURE — 74174 CTA ABD&PLVS W/CONTRAST: CPT

## 2022-03-29 PROCEDURE — 99204 OFFICE O/P NEW MOD 45 MIN: CPT | Performed by: THORACIC SURGERY (CARDIOTHORACIC VASCULAR SURGERY)

## 2022-03-29 PROCEDURE — 80053 COMPREHEN METABOLIC PANEL: CPT

## 2022-03-29 PROCEDURE — 36415 COLL VENOUS BLD VENIPUNCTURE: CPT

## 2022-03-29 PROCEDURE — 75574 CT ANGIO HRT W/3D IMAGE: CPT

## 2022-03-29 PROCEDURE — 85610 PROTHROMBIN TIME: CPT

## 2022-03-29 RX ORDER — 0.9 % SODIUM CHLORIDE 0.9 %
10 VIAL (ML) INJECTION
Status: COMPLETED | OUTPATIENT
Start: 2022-03-29 | End: 2022-03-29

## 2022-03-29 RX ADMIN — Medication 10 ML: at 08:09

## 2022-03-29 RX ADMIN — IOPAMIDOL 150 ML: 755 INJECTION, SOLUTION INTRAVENOUS at 08:08

## 2022-03-29 NOTE — CONSULTS
Department of Cardiovascular & Thoracic Surgery   Consult Note        Reason for Consult:    Requesting Physician:  Jessica Smith MD        Date of Consult: 03/29/22    Chief Complaint: SOB and fatigue    History Obtained From:  patient, electronic medical record    HISTORY OF PRESENT ILLNESS:    The patient is a 74 y.o. female who presents with SOB and difficulty doing her normal activities. The patient has a hx of PBC. She was noted to have a murmur. She does have worsening SOB and difficulty laying down. She does have a sharp pain intermittently in her mid chest.  She has stopped doing any significant activity. PBC and possible hepatocellular carcinoma. Pt was found to have a 4 cm mass in the liver. 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 Hip replacements in 2004         Past Medical History:        Diagnosis Date    Asthma      Past Surgical History:        Procedure Laterality Date    TOTAL HIP ARTHROPLASTY       Current Medications:   No current facility-administered medications for this encounter.   Allergies:  Prednisone    Social History:   Social History     Socioeconomic History    Marital status:      Spouse name: Not on file    Number of children: Not on file    Years of education: Not on file    Highest education level: Not on file   Occupational History    Not on file   Tobacco Use    Smoking status: Never Smoker    Smokeless tobacco: Never Used   Substance and Sexual Activity    Alcohol use: Not Currently    Drug use: Never    Sexual activity: Not on file   Other Topics Concern    Not on file   Social History Narrative    Not on file     Social Determinants of Health     Financial Resource Strain:     Difficulty of Paying Living Expenses: Not on file   Food Insecurity:  Worried About Running Out of Food in the Last Year: Not on file    Ella of Food in the Last Year: Not on file   Transportation Needs:     Lack of Transportation (Medical): Not on file    Lack of Transportation (Non-Medical):  Not on file   Physical Activity:     Days of Exercise per Week: Not on file    Minutes of Exercise per Session: Not on file   Stress:     Feeling of Stress : Not on file   Social Connections:     Frequency of Communication with Friends and Family: Not on file    Frequency of Social Gatherings with Friends and Family: Not on file    Attends Yarsani Services: Not on file    Active Member of [address] [place] [address] [email or web address] or Organizations: Not on file    Attends Club or Organization Meetings: Not on file    Marital Status: Not on file   Intimate Partner Violence:     Fear of Current or Ex-Partner: Not on file    Emotionally Abused: Not on file    Physically Abused: Not on file    Sexually Abused: Not on file   Housing Stability:     Unable to Pay for Housing in the Last Year: Not on file    Number of [place] in the Last Year: Not on file    Unstable Housing in the Last Year: Not on file     Family History:    Family History   Problem Relation Age of Onset    Other Mother         lymphoma    Stroke Mother     High Blood Pressure Mother     High Cholesterol Mother     Prostate Cancer Father     Colon Cancer Father     Other Father         Melanoma skin cancer    Diabetes Father     High Cholesterol Father        REVIEW OF SYSTEMS:  Constitutional: - fatigue, - fever, - chills, - night sweats  Eyes: - vision loss  Cardiovascular: -  chest pain, - palpitations, - leg swelling, - leg pain   Respiratory:+ cough,+ shortness of breath, - wheezing  GI: - nausea, - vomiting, - abdominal pain, - constipation, - diarrhea  : - dysuria   MSK: - joint pain, - muscle pain  Integument: - rash, - skin color change   Heme: - easy bruising or bleeding  Neurologic: - headache, - weakness, - dizziness, - paresthesias       EXAM:      General appearance: No apparent distress, appears stated age and cooperative. Skin: unremarkable  HEENT Normocephalic, atraumatic without obvious deformity. Neck: Supple, Trachea midline   Lungs: Good respiratory effort. Clear to auscultation, bilaterally  Heart: Regular rate/ rhythm   Abdomen: Soft, non-tender or non-distended, obese  Extremities: no LE edema warm well perfused  Neurologic: Alert, grossly intact, SANTORO spontaneously  Mental status: normal affect, calm and cooperative  Vascular: 2+ femoral pulses, no carotid bruits        DATA:  Cardiac cath -  Shows no CAD  TTE - severe AS with normal LV function  MRI liver shows cirrhosis  CT reviewed - good transfemoral candidate     IMPRESSION  There are no active hospital problems to display for this patient. Severe symptomatic aortic stenosis      RECOMMENDATIONS:    I had an extensive discussion with the patient about the differences between surgical aortic valve placement and transcatheter arctic valve placement. She would be low risk for surgical aortic valve replacement. My concerns would be that her platelet count is low and there is an MRI that shows cirrhosis presumably from her primary biliary cholangitis. She is also had previous avascular necrosis and bilateral hip replacements. Despite all of these medical issues, she remains extremely active and this aortic stenosis has slowed her down. Patient's STS score is 3.61%    We will discuss her case extensively in heart team meeting.       Electronically signed by Sam Wilson MD on 3/29/2022 at 10:54 AM

## 2022-03-29 NOTE — CONSULTS
Name:  Oseas Livingston /Age/Sex: 1955  (74 y.o. female)   MRN & CSN:  [id number] & [id number] Admission Date/Time: 3/29/2022  6:45 AM   Location:  [facility] PCP: Hobert Runner, Vicki Basurto Day: 1          Referring physician:  Rinku Menezes MD         Reason for consultation: For TAVR        Thanks for referral.    Information source: patient    CC; shortness of breath      HPI:   Thank you for involving me in taking  care of Oseas Livingston who  is a 74 y. o.year  Old female  Presents with with history of severe aortic stenosis, hypertension, PAF is being seen by Dr. Molina Tatum has been having increasing gradient across the valve the mean gradient is going from 37-43 patient , patient is usually very physically active swims however has lately noticed that her physical activity is somewhat declined however has no chest pain gets mild dyspneic on exertion                    Past medical history:    has a past medical history of Asthma. Past surgical history:   has a past surgical history that includes Total hip arthroplasty. Social History:   reports that she has never smoked. She has never used smokeless tobacco. She reports previous alcohol use. She reports that she does not use drugs. Family history:  family history includes Colon Cancer in her father; Diabetes in her father; High Blood Pressure in her mother; High Cholesterol in her father and mother; Other in her father and mother; Prostate Cancer in her father; Stroke in her mother. Allergies   Allergen Reactions    Prednisone      Has a sensitivity  Other reaction(s): Joint Pain       No current facility-administered medications for this encounter. No current facility-administered medications for this encounter.      Review of Systems:  All 14 systems reviewed, all negative except for  SOB    Physical Examination:    BP (!) 184/76   Pulse 54   Resp 16   Ht 5' 3\" (1.6 m)   Wt 198 lb (89.8 kg)   SpO2 100%   BMI 35.07 kg/m²      Wt Readings from Last 3 Encounters:   03/29/22 198 lb (89.8 kg)   03/01/22 189 lb (85.7 kg)   11/11/21 189 lb (85.7 kg)     Body mass index is 35.07 kg/m². General Appearance: Fair  Head: normocephalic     Eyes: normal, noninjected conjunctiva    ENT: normal mucosa, noninjected throat, normal     NECK: No JVP  No thyromegaly        Cardiovascular: No thrills palpated   Auscultation: Normal S1 and S2,  3/6 SE murmur   carotid bruit NO   Abdominal Aorta no bruit    Respiratory:    Breath sounds Clear = 0    Extremities:  none Edema clubbing ,   NO cyanosis    SKIN: Warm and well perfused, no pallor or cyanosis    Vascular exam:  Pedal Pulses: PALP  bilaterally        Abdomen:  No masses or tenderness. No organomegaly noted. Neurological:  Oriented to time, place, and person   No focal neurological deficit noted.   Psychiatric:normal mood, no anxiety    Lab Review   Recent Results (from the past 24 hour(s))   CBC    Collection Time: 03/29/22  7:06 AM   Result Value Ref Range    WBC 4.4 4.0 - 10.5 K/CU MM    RBC 5.14 4.2 - 5.4 M/CU MM    Hemoglobin 14.9 12.5 - 16.0 GM/DL    Hematocrit 45.1 37 - 47 %    MCV 87.7 78 - 100 FL    MCH 29.0 27 - 31 PG    MCHC 33.0 32.0 - 36.0 %    RDW 14.5 11.7 - 14.9 %    Platelets 92 (L) 255 - 440 K/CU MM    MPV 11.2 (H) 7.5 - 11.1 FL   Comprehensive Metabolic Panel    Collection Time: 03/29/22  7:06 AM   Result Value Ref Range    Sodium 141 135 - 145 MMOL/L    Potassium 4.9 3.5 - 5.1 MMOL/L    Chloride 105 99 - 110 mMol/L    CO2 24 21 - 32 MMOL/L    BUN 16 6 - 23 MG/DL    CREATININE 0.4 (L) 0.6 - 1.1 MG/DL    Glucose 156 (H) 70 - 99 MG/DL    Calcium 9.5 8.3 - 10.6 MG/DL    Albumin 4.3 3.4 - 5.0 GM/DL    Total Protein 7.6 6.4 - 8.2 GM/DL    Total Bilirubin 0.7 0.0 - 1.0 MG/DL    ALT 29 10 - 40 U/L    AST 28 15 - 37 IU/L    Alkaline Phosphatase 124 40 - 129 IU/L    GFR Non-African American >60 >60 mL/min/1.73m2    GFR African American >60 >60 mL/min/1.73m2    Anion Gap 12 4 - 16   Brain Natriuretic Peptide    Collection Time: 03/29/22  7:06 AM   Result Value Ref Range    Pro-BNP 84.32 <300 PG/ML   Protime-INR    Collection Time: 03/29/22  7:06 AM   Result Value Ref Range    Protime 14.3 11.7 - 14.5 SECONDS    INR 1.11 INDEX   APTT    Collection Time: 03/29/22  7:06 AM   Result Value Ref Range    aPTT 30.0 25.1 - 37.1 SECONDS   COVID-19, Rapid    Collection Time: 03/29/22  7:07 AM    Specimen: Nasopharyngeal   Result Value Ref Range    Source UNKNOWN     SARS-CoV-2, NAAT NOT DETECTED NOT DETECTED   POCT Creatinine    Collection Time: 03/29/22  7:31 AM   Result Value Ref Range    POC Creatinine 0.4 (L) 0.6 - 1.1 MG/DL    GFR Non-African American >60 >60 mL/min/1.73m2    GFR African American >60 >60 mL/min/1.73m2   EKG 12 Lead    Collection Time: 03/29/22  7:52 AM   Result Value Ref Range    Ventricular Rate 66 BPM    Atrial Rate 66 BPM    P-R Interval 192 ms    QRS Duration 96 ms    Q-T Interval 418 ms    QTc Calculation (Bazett) 438 ms    P Axis 38 degrees    R Axis -22 degrees    T Axis 20 degrees    Diagnosis       Sinus rhythm with marked sinus arrhythmia  Septal infarct , age undetermined  Abnormal ECG  No previous ECGs available             Assessment/Recommendations:     -Severe aortic stenosis: Here for assessment for TAVR risk benefits and alternatives of the procedure were discussed with the patient  Patient has back on her physical activities given that she has severe aortic stenosis and is somewhat symptomatic as well  I discussed with her the options, risk benefits of TAVR she will be undergoing a CTA today for further evaluation    -Patient also has history of PAF and is on Eliquis    -Hypertension patient on lisinopril and Lopressor    -Also has history of pulmonary sarcoidosis per records               Charlotte Maher MD, 3/29/2022 9:20 AM       Please note this report has been partially produced using speech recognition software and may contain errors related to that system including errors in grammar, punctuation, and spelling, as well as words and phrases that may be inappropriate. If there are any questions or concerns please feel free to contact the dictating provider for clarification.

## 2022-03-30 PROBLEM — I35.0 NONRHEUMATIC AORTIC VALVE STENOSIS: Status: ACTIVE | Noted: 2022-03-30

## 2022-04-12 DIAGNOSIS — D68.9 COAGULATION DEFECT (HCC): Primary | ICD-10-CM

## 2022-04-29 ENCOUNTER — HOSPITAL ENCOUNTER (OUTPATIENT)
Dept: CARDIAC CATH/INVASIVE PROCEDURES | Age: 67
Discharge: HOME | End: 2022-04-29

## 2022-05-02 ENCOUNTER — HOSPITAL ENCOUNTER (OUTPATIENT)
Dept: INFUSION THERAPY | Age: 67
Discharge: HOME | End: 2022-05-02
Payer: MEDICARE

## 2022-05-02 ENCOUNTER — INITIAL CONSULT (OUTPATIENT)
Dept: ONCOLOGY | Age: 67
End: 2022-05-02
Payer: MEDICARE

## 2022-05-02 VITALS
TEMPERATURE: 95.8 F | WEIGHT: 192 LBS | HEART RATE: 61 BPM | SYSTOLIC BLOOD PRESSURE: 165 MMHG | OXYGEN SATURATION: 98 % | BODY MASS INDEX: 34.02 KG/M2 | HEIGHT: 63 IN | DIASTOLIC BLOOD PRESSURE: 80 MMHG

## 2022-05-02 DIAGNOSIS — K74.3 HEPATIC CIRRHOSIS DUE TO PRIMARY BILIARY CHOLANGITIS (HCC): Primary | ICD-10-CM

## 2022-05-02 DIAGNOSIS — D68.9 COAGULATION DEFECT (HCC): ICD-10-CM

## 2022-05-02 DIAGNOSIS — K74.3 HEPATIC CIRRHOSIS DUE TO PRIMARY BILIARY CHOLANGITIS (HCC): ICD-10-CM

## 2022-05-02 LAB
ALBUMIN SERPL-MCNC: 4.4 GM/DL (ref 3.4–5)
ALP BLD-CCNC: 132 IU/L (ref 40–129)
ALT SERPL-CCNC: 29 U/L (ref 10–40)
ANION GAP SERPL CALCULATED.3IONS-SCNC: 10 MMOL/L (ref 4–16)
AST SERPL-CCNC: 27 IU/L (ref 15–37)
BANDED NEUTROPHILS ABSOLUTE COUNT: 0.05 K/CU MM
BANDED NEUTROPHILS RELATIVE PERCENT: 1 % (ref 5–11)
BILIRUB SERPL-MCNC: 0.6 MG/DL (ref 0–1)
BUN BLDV-MCNC: 13 MG/DL (ref 6–23)
CALCIUM SERPL-MCNC: 9.9 MG/DL (ref 8.3–10.6)
CHLORIDE BLD-SCNC: 105 MMOL/L (ref 99–110)
CO2: 25 MMOL/L (ref 21–32)
CREAT SERPL-MCNC: 0.5 MG/DL (ref 0.6–1.1)
DIFFERENTIAL TYPE: ABNORMAL
EOSINOPHILS ABSOLUTE: 0.2 K/CU MM
EOSINOPHILS RELATIVE PERCENT: 4 % (ref 0–3)
ERYTHROCYTE SEDIMENTATION RATE: 18 MM/HR (ref 0–30)
FOLATE: >20 NG/ML (ref 3.1–17.5)
GFR AFRICAN AMERICAN: >60 ML/MIN/1.73M2
GFR NON-AFRICAN AMERICAN: >60 ML/MIN/1.73M2
GLUCOSE BLD-MCNC: 159 MG/DL (ref 70–99)
HCT VFR BLD CALC: 43.7 % (ref 37–47)
HEMOGLOBIN: 14.5 GM/DL (ref 12.5–16)
LACTATE DEHYDROGENASE: 225 IU/L (ref 120–246)
LYMPHOCYTES ABSOLUTE: 1.3 K/CU MM
LYMPHOCYTES RELATIVE PERCENT: 26 % (ref 24–44)
MCH RBC QN AUTO: 29.4 PG (ref 27–31)
MCHC RBC AUTO-ENTMCNC: 33.2 % (ref 32–36)
MCV RBC AUTO: 88.5 FL (ref 78–100)
MONOCYTES ABSOLUTE: 0.6 K/CU MM
MONOCYTES RELATIVE PERCENT: 11 % (ref 0–4)
PDW BLD-RTO: 15.2 % (ref 11.7–14.9)
PLATELET # BLD: 100 K/CU MM (ref 140–440)
PMV BLD AUTO: 11.1 FL (ref 7.5–11.1)
POTASSIUM SERPL-SCNC: 5.3 MMOL/L (ref 3.5–5.1)
RBC # BLD: 4.94 M/CU MM (ref 4.2–5.4)
SEGMENTED NEUTROPHILS ABSOLUTE COUNT: 2.9 K/CU MM
SEGMENTED NEUTROPHILS RELATIVE PERCENT: 58 % (ref 36–66)
SODIUM BLD-SCNC: 140 MMOL/L (ref 135–145)
TOTAL PROTEIN: 7.5 GM/DL (ref 6.4–8.2)
VITAMIN B-12: 990.5 PG/ML (ref 211–911)
WBC # BLD: 5 K/CU MM (ref 4–10.5)

## 2022-05-02 PROCEDURE — 80053 COMPREHEN METABOLIC PANEL: CPT

## 2022-05-02 PROCEDURE — 86038 ANTINUCLEAR ANTIBODIES: CPT

## 2022-05-02 PROCEDURE — 85027 COMPLETE CBC AUTOMATED: CPT

## 2022-05-02 PROCEDURE — 82746 ASSAY OF FOLIC ACID SERUM: CPT

## 2022-05-02 PROCEDURE — 36415 COLL VENOUS BLD VENIPUNCTURE: CPT

## 2022-05-02 PROCEDURE — 85007 BL SMEAR W/DIFF WBC COUNT: CPT

## 2022-05-02 PROCEDURE — 1090F PRES/ABSN URINE INCON ASSESS: CPT | Performed by: INTERNAL MEDICINE

## 2022-05-02 PROCEDURE — 3017F COLORECTAL CA SCREEN DOC REV: CPT | Performed by: INTERNAL MEDICINE

## 2022-05-02 PROCEDURE — 1123F ACP DISCUSS/DSCN MKR DOCD: CPT | Performed by: INTERNAL MEDICINE

## 2022-05-02 PROCEDURE — 4040F PNEUMOC VAC/ADMIN/RCVD: CPT | Performed by: INTERNAL MEDICINE

## 2022-05-02 PROCEDURE — 85652 RBC SED RATE AUTOMATED: CPT

## 2022-05-02 PROCEDURE — 82607 VITAMIN B-12: CPT

## 2022-05-02 PROCEDURE — G8427 DOCREV CUR MEDS BY ELIG CLIN: HCPCS | Performed by: INTERNAL MEDICINE

## 2022-05-02 PROCEDURE — 83615 LACTATE (LD) (LDH) ENZYME: CPT

## 2022-05-02 PROCEDURE — 86430 RHEUMATOID FACTOR TEST QUAL: CPT

## 2022-05-02 PROCEDURE — 99204 OFFICE O/P NEW MOD 45 MIN: CPT | Performed by: INTERNAL MEDICINE

## 2022-05-02 PROCEDURE — G8417 CALC BMI ABV UP PARAM F/U: HCPCS | Performed by: INTERNAL MEDICINE

## 2022-05-02 PROCEDURE — 1036F TOBACCO NON-USER: CPT | Performed by: INTERNAL MEDICINE

## 2022-05-02 PROCEDURE — G8400 PT W/DXA NO RESULTS DOC: HCPCS | Performed by: INTERNAL MEDICINE

## 2022-05-02 RX ORDER — ASPIRIN 81 MG/1
81 TABLET ORAL DAILY
COMMUNITY
End: 2022-06-27

## 2022-05-02 ASSESSMENT — PATIENT HEALTH QUESTIONNAIRE - PHQ9
SUM OF ALL RESPONSES TO PHQ QUESTIONS 1-9: 0
SUM OF ALL RESPONSES TO PHQ9 QUESTIONS 1 & 2: 0
1. LITTLE INTEREST OR PLEASURE IN DOING THINGS: 0
SUM OF ALL RESPONSES TO PHQ QUESTIONS 1-9: 0
2. FEELING DOWN, DEPRESSED OR HOPELESS: 0

## 2022-05-02 NOTE — PROGRESS NOTES
MA Rooming Questions  Patient: Jason Jones  MRN: [id number]    Date: 5/2/2022        NP    5. Did the patient have a depression screening completed today?  Yes    PHQ-9 Total Score: 0 (5/2/2022 10:32 AM)       PHQ-9 Given to (if applicable):               PHQ-9 Score (if applicable):                     [] Positive     [x]  Negative              Does question #9 need addressed (if applicable)                     [] Yes    []  No               Lora Bui CMA

## 2022-05-02 NOTE — PROGRESS NOTES
Patient Name:  Rica Badillo  Patient :  1955  Patient MRN:  [id number]     Primary Oncologist: Kaela Hall MD  Referring Provider: Jossue Sargent MD     Date of Service: 2022      Reason for Consult: To evaluate the patient with thrombocytopenia. Chief Complaint:    Chief Complaint   Patient presents with    New Patient     Patient Active Problem List:     Coagulation defect [facility])     Nonrheumatic aortic valve stenosis    HPI:   Rica Badillo is a 59-year-old very pleasant female with medical history significant for hypertension, sarcoidosis, primary biliary cholangitis on actigall, cirrhosis of the liver and aortic stenosis, referred to me on 2022 for evaluation of her thrombocytopenia. She has thrombocytopenia since 2017. She planned to have aortic valve replacement for aortic stenosis. Because of thrombocytopenia, she was referred to me for further evaluation. I have discussed with Dr. Gagan West and he wants her platelet count more than 100,000/cumm for surgery. She denies easy bruising or bleeding before. Past Medical History:     Significant for  1. Hypertension  2. Sarcoidosis  3. 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 Aortic stenosis    Past Surgery History:    Significant for  1. Bilateral hip replacement    Social History:   She denies smoking, alcohol drinking or illicit drug abuse. She has 2 daughters. Family History:    Significant for colon cancer, prostate cancer and melanoma in her father. Her mother had non-Hodgkin lymphoma.     Allergies   Allergen Reactions    Prednisone      Has a sensitivity  Other reaction(s): Joint Pain       Current Outpatient Medications on File Prior to Visit   Medication Sig Dispense Refill    aspirin 81 MG EC tablet Take 81 mg by mouth daily      metoprolol succinate (TOPROL XL) 25 MG extended release tablet Take 1 tablet by mouth daily 30 tablet 3    Spacer/Aero-Holding Chambers YUVAL 1 Device by Does not apply route daily as needed (use with albuterol rescue inhaler) 1 each 0    apixaban (ELIQUIS) 5 MG TABS tablet Take by mouth 2 times daily      lisinopril (PRINIVIL;ZESTRIL) 2.5 MG tablet Take by mouth daily 1/2 tab qam and 1/2 tab qhs      ursodiol (ACTIGALL) 300 MG capsule Take 1 capsule by mouth 4 times daily      Multiple Vitamins-Minerals (MULTIVITAMIN WOMEN 50+ PO) Take 1 tablet by mouth daily      albuterol sulfate HFA (PROVENTIL HFA) 108 (90 Base) MCG/ACT inhaler Inhale 2 puffs into the lungs every 6 hours as needed for Wheezing 3 each 3     No current facility-administered medications on file prior to visit. Review of Systems:  Constitutional:  No weight loss, No fever, No chills, No night sweats. Energy level good. Eyes:  No diplopia, No transient or permanent loss of vision, No scotomata. ENT / Mouth:  No epistaxis, No dysphagia, No hoarseness, No oral ulcers, No gingival bleeding. No sore throat, No postnasal drip, No nasal drip, No mouth pain, No sinus pain, No tinnitus, Normal hearing. Cardiovascular:  No chest pain, No palpitations, No syncope, No upper extremity edema, No lower extremity edema, No calf discomfort. Respiratory:  No cough. No hemoptysis, No pleurisy, No wheezing, No dyspnea. Breast:  No breast mass, No pain, No nipple discharge, No change in size, No change in shape. Gastrointestinal:  No abdominal pain, No abdominal cramping, No nausea, No vomiting, No constipation, No diarrhea, No hematochezia, No melena, No jaundice, No dyspepsia, No dysphagia. Urinary:  No dysuria, No hematuria, No urinary incontinence. Gynecological:  No vaginal discharge, No suprapubic pain, No abnormal vaginal bleeding. Musculoskeletal:  No muscle pain, No swollen joints, No joint redness, No bone pain, No spine tenderness. Skin:  No rash, No nodules, No pruritus, No lesions.   Neurologic:  No confusion, No seizures, No syncope, No tremor, No speech change, No headache, No hiccups, No abnormal gait, No sensory changes, No weakness. Psychiatric:  No depression, No anxiety, Concentration normal.  Endocrine:  No polyuria, No polydipsia, No hot flashes, No thyroid symptoms. Hematologic:  No epistaxis, No gingival bleeding, No petechiae, No ecchymosis. Lymphatic:  No lymphadenopathy, No lymphedema. Allergy / Immunologic:  No eczema, No frequent mucous infections, No frequent respiratory infections, No recurrent urticarial, No frequent skin infections. Vital Signs: BP (!) 165/80 (Site: Right Upper Arm, Position: Sitting, Cuff Size: Medium Adult)   Pulse 61   Temp 95.8 °F (35.4 °C) (Infrared)   Ht 5' 3\" (1.6 m)   Wt 192 lb (87.1 kg)   SpO2 98%   BMI 34.01 kg/m²      Physical Exam:  CONSTITUTIONAL: awake, alert, cooperative, no apparent distress   EYES: pupils equal, round and reactive to light, sclera clear, normal conjunctiva  ENT: Normocephalic, without obvious abnormality, atraumatic  NECK: supple, symmetrical, no jugular venous distension, no carotid bruits   HEMATOLOGIC/LYMPHATIC: no cervical, supraclavicular or axillary lymphadenopathy   LUNGS: VBS, no wheezes, no increased work of breathing, no rhonchi, clear to auscultation, no crackles,    CARDIOVASCULAR: regular rate and rhythm, normal S1 and S2, murmur noted. ABDOMEN: normal bowel sounds x 4, soft, non-distended, non-tender, no masses palpated, no hepatosplenomegaly   MUSCULOSKELETAL: full range of motion noted, tone is normal  NEUROLOGIC: awake, alert, oriented to name, place and time. Motor skills grossly intact. SKIN: appears intact, normal skin color, normal texture, normal turgor, no jaundice.    EXTREMITIES: no LE edema, no leg swelling, no cyanosis, no clubbing,       Labs:  Hematology:  Lab Results   Component Value Date    WBC 5.0 05/02/2022    RBC 4.94 05/02/2022    HGB 14.5 05/02/2022    HCT 43.7 05/02/2022    MCV 88.5 05/02/2022    MCH 29.4 05/02/2022    MCHC 33.2 05/02/2022    RDW 15.2 (H) 05/02/2022     (L) 05/02/2022    MPV 11.1 05/02/2022    BANDSPCT 1 (L) 05/02/2022    SEGSPCT 58.0 05/02/2022    EOSRELPCT 4.0 (H) 05/02/2022    BASOPCT 1.6 (H) 02/22/2022    LYMPHOPCT 26.0 05/02/2022    MONOPCT 11.0 (H) 05/02/2022    BANDABS 0.05 05/02/2022    SEGSABS 2.9 05/02/2022    EOSABS 0.2 05/02/2022    BASOSABS 0.1 02/22/2022    LYMPHSABS 1.3 05/02/2022    MONOSABS 0.6 05/02/2022    DIFFTYPE MANUAL DIFFERENTIAL 05/02/2022     Lab Results   Component Value Date    ESR 18 05/02/2022     Chemistry:  Lab Results   Component Value Date     05/02/2022    K 5.3 (H) 05/02/2022     05/02/2022    CO2 25 05/02/2022    BUN 13 05/02/2022    CREATININE 0.5 (L) 05/02/2022    GLUCOSE 159 (H) 05/02/2022    CALCIUM 9.9 05/02/2022    PROT 7.5 05/02/2022    LABALBU 4.4 05/02/2022    BILITOT 0.6 05/02/2022    ALKPHOS 132 (H) 05/02/2022    AST 27 05/02/2022    ALT 29 05/02/2022    LABGLOM >60 05/02/2022    GFRAA >60 05/02/2022     Lab Results   Component Value Date     05/02/2022     No components found for: LD  No results found for: TSHHS, T4FREE, FT3  Immunology:  Lab Results   Component Value Date    PROT 7.5 05/02/2022    ALBUMINELP 3.9 03/01/2019    LABALPH 0.3 03/01/2019    LABALPH 0.7 03/01/2019    LABBETA 1.1 03/01/2019    GAMGLOB 1.6 03/01/2019     No results found for: [place], KLFLCR  No results found for: B2M  Coagulation Panel:  Lab Results   Component Value Date    PROTIME 14.3 03/29/2022    INR 1.11 03/29/2022    APTT 30.0 03/29/2022     Anemia Panel:  Lab Results   Component Value Date    Darryn Eagler 990.5 (H) 05/02/2022    FOLATE >20.0 (H) 05/02/2022     Tumor Markers:  No results found for: , CEA, , LABCA2, PSA     Observations:  PHQ-9 Total Score: 0 (5/2/2022 10:32 AM)     Assessment   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 She has thrombocytopenia since 9/2017. She planned to have aortic valve replacement for aortic stenosis and Dr. Isaac Strong wants her platelet count to be more than 100,000/cumm for surgery. I reviewed laboratory work ups done today. I believe her thrombocytopenia is due to underlying liver cirrhosis (e.g impaired platelet production from decreased hepatic synthesis of thrombopoietin and increased platelet sequestration in the spleen, in the setting of portal hypertension and hypersplenism). Since her platelet count today was 100,000/cumm, I recommend to proceed with aortic valve surgery as planned and to give platelet transfusion as needed before, during or after surgery. It will be more predictable and reliable than using avatrombopag. I will discuss that with Dr. Isaac Strong. I answered all her questions and concerns for today. I asked her to follow up with primary care physician on regular basis. I will continue to keep you updated on her progress. Thank you for allowing me to participate in the care of this very pleasant patient. Recent imaging and labs were reviewed and discussed with the patient.

## 2022-05-02 NOTE — LETTER
[address] [place] [id number]  Phone: [phone]  Fax: [phone]           Earlene Hair MD      May 2, 2022     Patient: Keshav Villa   MR Number: [id number]   YOB: 1955   Date of Visit: 5/2/2022       Dear Dr. Minnie Ruedaer: Thank you for referring Sal Weems to me for evaluation/treatment. Below are the relevant portions of my assessment and plan of care. If you have questions, please do not hesitate to call me. I look forward to following [place] along with you. Sincerely,        Earlene Hair MD    CC providers:  Edil Baez MD  [address] [place] [phone]  [address]  Horacio Song MD  [address]  [address], [place]  [address] [place]

## 2022-05-03 LAB — RHEUMATOID FACTOR: <10 IU/ML (ref 0–14)

## 2022-05-04 ENCOUNTER — HOSPITAL ENCOUNTER (OUTPATIENT)
Dept: CARDIAC CATH/INVASIVE PROCEDURES | Age: 67
Discharge: HOME | End: 2022-05-04

## 2022-05-04 LAB — ANTI-NUCLEAR ANTIBODY (ANA): NORMAL

## 2022-05-11 ENCOUNTER — HOSPITAL ENCOUNTER (OUTPATIENT)
Dept: CARDIAC CATH/INVASIVE PROCEDURES | Age: 67
Discharge: HOME | End: 2022-05-11

## 2022-05-25 ENCOUNTER — HOSPITAL ENCOUNTER (OUTPATIENT)
Dept: INFUSION THERAPY | Age: 67
Discharge: HOME | End: 2022-05-25

## 2022-05-25 ENCOUNTER — OFFICE VISIT (OUTPATIENT)
Dept: ONCOLOGY | Age: 67
End: 2022-05-25
Payer: MEDICARE

## 2022-05-25 VITALS
DIASTOLIC BLOOD PRESSURE: 61 MMHG | HEART RATE: 68 BPM | BODY MASS INDEX: 34.91 KG/M2 | SYSTOLIC BLOOD PRESSURE: 157 MMHG | OXYGEN SATURATION: 98 % | WEIGHT: 197 LBS | HEIGHT: 63 IN | TEMPERATURE: 97.2 F

## 2022-05-25 DIAGNOSIS — K74.3 HEPATIC CIRRHOSIS DUE TO PRIMARY BILIARY CHOLANGITIS (HCC): Primary | ICD-10-CM

## 2022-05-25 PROCEDURE — G8427 DOCREV CUR MEDS BY ELIG CLIN: HCPCS | Performed by: INTERNAL MEDICINE

## 2022-05-25 PROCEDURE — 99213 OFFICE O/P EST LOW 20 MIN: CPT | Performed by: INTERNAL MEDICINE

## 2022-05-25 PROCEDURE — 1036F TOBACCO NON-USER: CPT | Performed by: INTERNAL MEDICINE

## 2022-05-25 PROCEDURE — 1123F ACP DISCUSS/DSCN MKR DOCD: CPT | Performed by: INTERNAL MEDICINE

## 2022-05-25 PROCEDURE — G8400 PT W/DXA NO RESULTS DOC: HCPCS | Performed by: INTERNAL MEDICINE

## 2022-05-25 PROCEDURE — 1090F PRES/ABSN URINE INCON ASSESS: CPT | Performed by: INTERNAL MEDICINE

## 2022-05-25 PROCEDURE — 3017F COLORECTAL CA SCREEN DOC REV: CPT | Performed by: INTERNAL MEDICINE

## 2022-05-25 PROCEDURE — G8417 CALC BMI ABV UP PARAM F/U: HCPCS | Performed by: INTERNAL MEDICINE

## 2022-05-25 ASSESSMENT — PATIENT HEALTH QUESTIONNAIRE - PHQ9
1. LITTLE INTEREST OR PLEASURE IN DOING THINGS: 0
SUM OF ALL RESPONSES TO PHQ QUESTIONS 1-9: 0
SUM OF ALL RESPONSES TO PHQ9 QUESTIONS 1 & 2: 0
2. FEELING DOWN, DEPRESSED OR HOPELESS: 0
SUM OF ALL RESPONSES TO PHQ QUESTIONS 1-9: 0

## 2022-05-25 NOTE — PROGRESS NOTES
MA Rooming Questions  Patient: Ricarda Moya  MRN: [id number]    Date: 5/25/2022        1. Do you have any new issues?   no         2. Do you need any refills on medications?    no    3. Have you had any imaging done since your last visit? yes - labs 5/2    4. Have you been hospitalized or seen in the emergency room since your last visit here?   no    5. Did the patient have a depression screening completed today?  Yes    PHQ-9 Total Score: 0 (5/25/2022 11:29 AM)       PHQ-9 Given to (if applicable):               PHQ-9 Score (if applicable):                     [] Positive     []  Negative              Does question #9 need addressed (if applicable)                     [] Yes    []  No               Petrona Gomes CMA

## 2022-05-25 NOTE — PROGRESS NOTES
Patient Name:  Gage Hyman  Patient :  1955  Patient MRN:  [id number]     Primary Oncologist: Rogelio Mauricio MD  Referring Provider: Hima Bonilla MD     Date of Service: 2022      Chief Complaint:    Chief Complaint   Patient presents with    Follow-up     Patient Active Problem List:     Coagulation defect [facility])     Nonrheumatic aortic valve stenosis    HPI:   Gage Hyman is a 47-year-old very pleasant female with medical history significant for hypertension, sarcoidosis, primary biliary cholangitis on actigall, cirrhosis of the liver and aortic stenosis, referred to me on 2022 for evaluation of her thrombocytopenia. She has thrombocytopenia since 2017. She planned to have aortic valve replacement for aortic stenosis. Because of thrombocytopenia, she was referred to me for further evaluation. I have discussed with Dr. Hannah Ma and he wants her platelet count more than 100,000/cumm for surgery. Laboratory work-ups done on 2022 showed mild thrombocytopenia (the platelet 112,076/PVNV). Her hemoglobin, WBC, LDH, VIOLETTA, rheumatoid factor, B12 and folate were within normal range. On May 25, 2022, she presented to me for follow-up. She was initially referred to me for evaluation of her thrombocytopenia. I reviewed findings on laboratory work ups done on 22. Her thrombocytopenia is due to underlying liver cirrhosis (e.g impaired platelet production from decreased hepatic synthesis of thrombopoietin and increased platelet sequestration in the spleen, in the setting of portal hypertension and hypersplenism). Since her platelet count on 80 was 100,000/cumm, I recommend to proceed with aortic valve surgery as planned and to give platelet transfusion as needed before, during or after surgery as needed. It will be more predictable and reliable than using avatrombopag. I discussed that with Dr. Hannah Ma. I will continue to follow her thrombocytopenia periodically.  She denies easy bruising or bleeding before. Past Medical History:     Significant for  1. Hypertension  2. Sarcoidosis  3. 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 Aortic stenosis    Past Surgery History:    Significant for  1. Bilateral hip replacement    Social History:   She denies smoking, alcohol drinking or illicit drug abuse. She has 2 daughters. Family History:    Significant for colon cancer, prostate cancer and melanoma in her father. Her mother had non-Hodgkin lymphoma. Allergies   Allergen Reactions    Prednisone      Has a sensitivity  Other reaction(s): Joint Pain      Review of Systems:  Constitutional:  No weight loss, No fever, No chills, No night sweats. Energy level is fair. Eyes:  No diplopia, No transient or permanent loss of vision, No scotomata. ENT / Mouth:  No epistaxis, No dysphagia, No hoarseness, No oral ulcers, No gingival bleeding. No sore throat, No postnasal drip, No nasal drip, No mouth pain, No sinus pain, No tinnitus, Normal hearing. Cardiovascular:  No chest pain, No palpitations, No syncope, No upper extremity edema, No lower extremity edema, No calf discomfort. Respiratory:  No cough. No hemoptysis, No pleurisy, No wheezing, No dyspnea. Breast:  No breast mass, No pain, No nipple discharge, No change in size, No change in shape. Gastrointestinal:  No abdominal pain, No abdominal cramping, No nausea, No vomiting, No constipation, No diarrhea, No hematochezia, No melena, No jaundice, No dyspepsia, No dysphagia. Urinary:  No dysuria, No hematuria, No urinary incontinence. Gynecological:  No vaginal discharge, No suprapubic pain, No abnormal vaginal bleeding. Musculoskeletal:  No muscle pain, No swollen joints, No joint redness, No bone pain, No spine tenderness. Skin:  No rash, No nodules, No pruritus, No lesions.   Neurologic:  No confusion, No seizures, No syncope, No tremor, No speech change, No headache, No hiccups, No abnormal gait, No sensory changes, No weakness. Psychiatric:  No depression, No anxiety, Concentration normal.  Endocrine:  No polyuria, No polydipsia, No hot flashes, No thyroid symptoms. Hematologic:  No epistaxis, No gingival bleeding, No petechiae, No ecchymosis. Lymphatic:  No lymphadenopathy, No lymphedema. Allergy / Immunologic:  No eczema, No frequent mucous infections, No frequent respiratory infections, No recurrent urticarial, No frequent skin infections. Vital Signs: BP (!) 157/61 (Site: Right Upper Arm, Position: Sitting, Cuff Size: Large Adult)   Pulse 68   Temp 97.2 °F (36.2 °C) (Temporal)   Ht 5' 3\" (1.6 m)   Wt 197 lb (89.4 kg)   SpO2 98%   BMI 34.90 kg/m²      Physical Exam:  CONSTITUTIONAL: awake, alert, cooperative, no apparent distress   EYES: pupils equal, round and reactive to light, sclera clear, normal conjunctiva  ENT: Normocephalic, without obvious abnormality, atraumatic  NECK: supple, symmetrical, no jugular venous distension, no carotid bruits   HEMATOLOGIC/LYMPHATIC: no cervical, supraclavicular or axillary lymphadenopathy   LUNGS: VBS, no wheezes, clear to auscultation, no crackles, no increased work of breathing, no rhonchi,    CARDIOVASCULAR: regular rate and rhythm, normal S1 and S2, murmur noted. ABDOMEN: normal bowel sounds x 4, soft, non-distended, non-tender, no masses palpated, no hepatosplenomegaly   MUSCULOSKELETAL: full range of motion noted, tone is normal  NEUROLOGIC: awake, alert, oriented to name, place and time. Motor skills grossly intact. SKIN: appears intact, normal skin color, normal texture, normal turgor, no jaundice.    EXTREMITIES: no LE edema, no clubbing, no leg swelling, no cyanosis,       Labs:  Hematology:  Lab Results   Component Value Date    WBC 5.0 05/02/2022    RBC 4.94 05/02/2022    HGB 14.5 05/02/2022    HCT 43.7 05/02/2022    MCV 88.5 05/02/2022    MCH 29.4 05/02/2022    MCHC 33.2 05/02/2022    RDW 15.2 (H) 05/02/2022     (L) 05/02/2022    MPV 11.1 05/02/2022    BANDSPCT 1 (L) 05/02/2022    SEGSPCT 58.0 05/02/2022    EOSRELPCT 4.0 (H) 05/02/2022    BASOPCT 1.6 (H) 02/22/2022    LYMPHOPCT 26.0 05/02/2022    MONOPCT 11.0 (H) 05/02/2022    BANDABS 0.05 05/02/2022    SEGSABS 2.9 05/02/2022    EOSABS 0.2 05/02/2022    BASOSABS 0.1 02/22/2022    LYMPHSABS 1.3 05/02/2022    MONOSABS 0.6 05/02/2022    DIFFTYPE MANUAL DIFFERENTIAL 05/02/2022     Lab Results   Component Value Date    ESR 18 05/02/2022     Chemistry:  Lab Results   Component Value Date     05/02/2022    K 5.3 (H) 05/02/2022     05/02/2022    CO2 25 05/02/2022    BUN 13 05/02/2022    CREATININE 0.5 (L) 05/02/2022    GLUCOSE 159 (H) 05/02/2022    CALCIUM 9.9 05/02/2022    PROT 7.5 05/02/2022    LABALBU 4.4 05/02/2022    BILITOT 0.6 05/02/2022    ALKPHOS 132 (H) 05/02/2022    AST 27 05/02/2022    ALT 29 05/02/2022    LABGLOM >60 05/02/2022    GFRAA >60 05/02/2022     Lab Results   Component Value Date     05/02/2022     No components found for: LD  No results found for: TSHHS, T4FREE, FT3  Immunology:  Lab Results   Component Value Date    PROT 7.5 05/02/2022    ALBUMINELP 3.9 03/01/2019    LABALPH 0.3 03/01/2019    LABALPH 0.7 03/01/2019    LABBETA 1.1 03/01/2019    GAMGLOB 1.6 03/01/2019     No results found for: Stacie Carton, KLFLCR  No results found for: B2M  Coagulation Panel:  Lab Results   Component Value Date    PROTIME 14.3 03/29/2022    INR 1.11 03/29/2022    APTT 30.0 03/29/2022     Anemia Panel:  Lab Results   Component Value Date    Sharlyne Duverney 990.5 (H) 05/02/2022    FOLATE >20.0 (H) 05/02/2022     Tumor Markers:  No results found for: , CEA, , LABCA2, PSA     Observations:  PHQ-9 Total Score: 0 (5/25/2022 11:29 AM)     Assessment   Thrombocytopenia - likely due to hypersplenism due to underlying cirrhosis of liver    Plan:  Buffy Navarro is a 78-year-old very pleasant female with medical history significant for sarcoidosis, 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 She has thrombocytopenia since 9/2017. She planned to have aortic valve replacement for aortic stenosis and Dr. Tg Jones wants her platelet count to be more than 100,000/cumm for surgery. Laboratory work-ups done on 5/2/2022 showed mild thrombocytopenia (the platelet 849,757/NDKX). Her hemoglobin, WBC, LDH, VIOLETTA, rheumatoid factor, B12 and folate were within normal range. On May 25, 2022, she presented to me for follow-up. She was initially referred to me for evaluation of her thrombocytopenia. I reviewed findings on laboratory work ups done on 5/2/22. Her thrombocytopenia is due to underlying liver cirrhosis (e.g impaired platelet production from decreased hepatic synthesis of thrombopoietin and increased platelet sequestration in the spleen, in the setting of portal hypertension and hypersplenism). Since her platelet count on 9/8/12 was 100,000/cumm, I recommend to proceed with aortic valve surgery as planned and to give platelet transfusion as needed before, during or after surgery as needed. It will be more predictable and reliable than using avatrombopag. I discussed that with Dr. Tg Jones. I will continue to follow her thrombocytopenia periodically. I answered all her questions and concerns for today. I asked her to follow up with primary care physician on regular basis. Recent imaging and labs were reviewed and discussed with the patient.

## 2022-05-27 ENCOUNTER — ANESTHESIA EVENT (OUTPATIENT)
Dept: CARDIAC CATH/INVASIVE PROCEDURES | Age: 67
DRG: 267 | End: 2022-05-27
Payer: MEDICARE

## 2022-06-01 ENCOUNTER — HOSPITAL ENCOUNTER (OUTPATIENT)
Dept: CARDIAC CATH/INVASIVE PROCEDURES | Age: 67
Discharge: HOME | End: 2022-06-01

## 2022-06-16 ENCOUNTER — APPOINTMENT (OUTPATIENT)
Dept: GENERAL RADIOLOGY | Age: 67
End: 2022-06-16
Attending: THORACIC SURGERY (CARDIOTHORACIC VASCULAR SURGERY)
Payer: MEDICARE

## 2022-06-16 ENCOUNTER — HOSPITAL ENCOUNTER (OUTPATIENT)
Dept: CARDIAC CATH/INVASIVE PROCEDURES | Age: 67
Discharge: HOME | End: 2022-06-16
Attending: THORACIC SURGERY (CARDIOTHORACIC VASCULAR SURGERY) | Admitting: THORACIC SURGERY (CARDIOTHORACIC VASCULAR SURGERY)
Payer: MEDICARE

## 2022-06-16 ENCOUNTER — HOSPITAL ENCOUNTER (OUTPATIENT)
Age: 67
Discharge: HOME | End: 2022-06-16
Payer: MEDICARE

## 2022-06-16 LAB
ALBUMIN SERPL-MCNC: 4.3 GM/DL (ref 3.4–5)
ALP BLD-CCNC: 134 IU/L (ref 40–128)
ALT SERPL-CCNC: 33 U/L (ref 10–40)
ANION GAP SERPL CALCULATED.3IONS-SCNC: 9 MMOL/L (ref 4–16)
APTT: 28.1 SECONDS (ref 25.1–37.1)
AST SERPL-CCNC: 31 IU/L (ref 15–37)
BILIRUB SERPL-MCNC: 0.5 MG/DL (ref 0–1)
BUN BLDV-MCNC: 16 MG/DL (ref 6–23)
CALCIUM SERPL-MCNC: 9.3 MG/DL (ref 8.3–10.6)
CHLORIDE BLD-SCNC: 106 MMOL/L (ref 99–110)
CO2: 27 MMOL/L (ref 21–32)
CREAT SERPL-MCNC: 0.4 MG/DL (ref 0.6–1.1)
EKG ATRIAL RATE: 61 BPM
EKG DIAGNOSIS: NORMAL
EKG P AXIS: 33 DEGREES
EKG P-R INTERVAL: 194 MS
EKG Q-T INTERVAL: 422 MS
EKG QRS DURATION: 98 MS
EKG QTC CALCULATION (BAZETT): 424 MS
EKG R AXIS: -28 DEGREES
EKG T AXIS: 18 DEGREES
EKG VENTRICULAR RATE: 61 BPM
GFR AFRICAN AMERICAN: >60 ML/MIN/1.73M2
GFR NON-AFRICAN AMERICAN: >60 ML/MIN/1.73M2
GLUCOSE BLD-MCNC: 175 MG/DL (ref 70–99)
HCT VFR BLD CALC: 42.8 % (ref 37–47)
HEMOGLOBIN: 14 GM/DL (ref 12.5–16)
INR BLD: 1.03 INDEX
MCH RBC QN AUTO: 29.4 PG (ref 27–31)
MCHC RBC AUTO-ENTMCNC: 32.7 % (ref 32–36)
MCV RBC AUTO: 89.7 FL (ref 78–100)
PDW BLD-RTO: 13.7 % (ref 11.7–14.9)
PLATELET # BLD: 97 K/CU MM (ref 140–440)
PMV BLD AUTO: 11.4 FL (ref 7.5–11.1)
POTASSIUM SERPL-SCNC: 4.5 MMOL/L (ref 3.5–5.1)
PRO-BNP: 75.53 PG/ML
PROTHROMBIN TIME: 13.3 SECONDS (ref 11.7–14.5)
RBC # BLD: 4.77 M/CU MM (ref 4.2–5.4)
SODIUM BLD-SCNC: 142 MMOL/L (ref 135–145)
TOTAL PROTEIN: 7.5 GM/DL (ref 6.4–8.2)
WBC # BLD: 3.8 K/CU MM (ref 4–10.5)

## 2022-06-16 PROCEDURE — 86922 COMPATIBILITY TEST ANTIGLOB: CPT

## 2022-06-16 PROCEDURE — 93005 ELECTROCARDIOGRAM TRACING: CPT | Performed by: THORACIC SURGERY (CARDIOTHORACIC VASCULAR SURGERY)

## 2022-06-16 PROCEDURE — 85730 THROMBOPLASTIN TIME PARTIAL: CPT

## 2022-06-16 PROCEDURE — 93010 ELECTROCARDIOGRAM REPORT: CPT | Performed by: INTERNAL MEDICINE

## 2022-06-16 PROCEDURE — 80053 COMPREHEN METABOLIC PANEL: CPT

## 2022-06-16 PROCEDURE — 83880 ASSAY OF NATRIURETIC PEPTIDE: CPT

## 2022-06-16 PROCEDURE — 85027 COMPLETE CBC AUTOMATED: CPT

## 2022-06-16 PROCEDURE — 86900 BLOOD TYPING SEROLOGIC ABO: CPT

## 2022-06-16 PROCEDURE — 86850 RBC ANTIBODY SCREEN: CPT

## 2022-06-16 PROCEDURE — 85610 PROTHROMBIN TIME: CPT

## 2022-06-16 PROCEDURE — 86901 BLOOD TYPING SEROLOGIC RH(D): CPT

## 2022-06-16 PROCEDURE — 36415 COLL VENOUS BLD VENIPUNCTURE: CPT

## 2022-06-16 PROCEDURE — 71045 X-RAY EXAM CHEST 1 VIEW: CPT

## 2022-06-17 NOTE — H&P
CC; shortness of breath      pt here for TAVR    HPI:   Thank you for involving me in taking  care of Reece Childs who  is a 74 y. o.year  Old female  Presents with with history of severe aortic stenosis, hypertension, PAF is being seen by Dr. Neida Ramires has been having increasing gradient across the valve the mean gradient is going from 37-43 patient , patient is usually very physically active swims however has lately noticed that her physical activity is somewhat declined however has no chest pain gets mild dyspneic on exertion                          Past medical history:    has a past medical history of Asthma. Past surgical history:   has a past surgical history that includes Total hip arthroplasty. Social History:   reports that she has never smoked. She has never used smokeless tobacco. She reports previous alcohol use. She reports that she does not use drugs. Family history:  family history includes Colon Cancer in her father; Diabetes in her father; High Blood Pressure in her mother; High Cholesterol in her father and mother;  Other in her father and mother; Prostate Cancer in her father; Stroke in her mother.           Allergies   Allergen Reactions    Prednisone         Has a sensitivity  Other reaction(s): Joint Pain           Current Meds Link used for Sign Out Report   No current facility-administered medications for this encounter.      Current Facility-Administered Medications   No current facility-administered medications for this encounter.         Review of Systems:  All 14 systems reviewed, all negative except for  SOB     Physical Examination:    BP (!) 184/76   Pulse 54   Resp 16   Ht 5' 3\" (1.6 m)   Wt 198 lb (89.8 kg)   SpO2 100%   BMI 35.07 kg/m²          Wt Readings from Last 3 Encounters:   03/29/22 198 lb (89.8 kg)   03/01/22 189 lb (85.7 kg)   11/11/21 189 lb (85.7 kg)      Body mass index is 35.07 kg/m².        General Appearance: Fair  Head: normocephalic     Eyes: normal, noninjected conjunctiva     ENT: normal mucosa, noninjected throat, normal      NECK: No JVP  No thyromegaly           Cardiovascular: No thrills palpated   Auscultation: Normal S1 and S2,  3/6 SE murmur   carotid bruit NO   Abdominal Aorta no bruit     Respiratory:    Breath sounds Clear = 0     Extremities:  none Edema clubbing ,   NO cyanosis     SKIN: Warm and well perfused, no pallor or cyanosis     Vascular exam:  Pedal Pulses: PALP  bilaterally         Abdomen:  No masses or tenderness. No organomegaly noted.     Neurological:  Oriented to time, place, and person   No focal neurological deficit noted.   Psychiatric:normal mood, no anxiety     Lab Review   Recent Results          Recent Results (from the past 24 hour(s))   CBC     Collection Time: 03/29/22  7:06 AM   Result Value Ref Range     WBC 4.4 4.0 - 10.5 K/CU MM     RBC 5.14 4.2 - 5.4 M/CU MM     Hemoglobin 14.9 12.5 - 16.0 GM/DL     Hematocrit 45.1 37 - 47 %     MCV 87.7 78 - 100 FL     MCH 29.0 27 - 31 PG     MCHC 33.0 32.0 - 36.0 %     RDW 14.5 11.7 - 14.9 %     Platelets 92 (L) 311 - 440 K/CU MM     MPV 11.2 (H) 7.5 - 11.1 FL   Comprehensive Metabolic Panel     Collection Time: 03/29/22  7:06 AM   Result Value Ref Range     Sodium 141 135 - 145 MMOL/L     Potassium 4.9 3.5 - 5.1 MMOL/L     Chloride 105 99 - 110 mMol/L     CO2 24 21 - 32 MMOL/L     BUN 16 6 - 23 MG/DL     CREATININE 0.4 (L) 0.6 - 1.1 MG/DL     Glucose 156 (H) 70 - 99 MG/DL     Calcium 9.5 8.3 - 10.6 MG/DL     Albumin 4.3 3.4 - 5.0 GM/DL     Total Protein 7.6 6.4 - 8.2 GM/DL     Total Bilirubin 0.7 0.0 - 1.0 MG/DL     ALT 29 10 - 40 U/L     AST 28 15 - 37 IU/L     Alkaline Phosphatase 124 40 - 129 IU/L     GFR Non-African American >60 >60 mL/min/1.73m2     GFR African American >60 >60 mL/min/1.73m2     Anion Gap 12 4 - 16   Brain Natriuretic Peptide     Collection Time: 03/29/22  7:06 AM   Result Value Ref Range     Pro-BNP 84.32 <300 PG/ML   Protime-INR     Collection Time: 03/29/22 7:06 AM   Result Value Ref Range     Protime 14.3 11.7 - 14.5 SECONDS     INR 1.11 INDEX   APTT     Collection Time: 03/29/22  7:06 AM   Result Value Ref Range     aPTT 30.0 25.1 - 37.1 SECONDS   COVID-19, Rapid     Collection Time: 03/29/22  7:07 AM     Specimen: Nasopharyngeal   Result Value Ref Range     Source UNKNOWN       SARS-CoV-2, NAAT NOT DETECTED NOT DETECTED   POCT Creatinine     Collection Time: 03/29/22  7:31 AM   Result Value Ref Range     POC Creatinine 0.4 (L) 0.6 - 1.1 MG/DL     GFR Non-African American >60 >60 mL/min/1.73m2     GFR African American >60 >60 mL/min/1.73m2   EKG 12 Lead     Collection Time: 03/29/22  7:52 AM   Result Value Ref Range     Ventricular Rate 66 BPM     Atrial Rate 66 BPM     P-R Interval 192 ms     QRS Duration 96 ms     Q-T Interval 418 ms     QTc Calculation (Bazett) 438 ms     P Axis 38 degrees     R Axis -22 degrees     T Axis 20 degrees     Diagnosis           Sinus rhythm with marked sinus arrhythmia  Septal infarct , age undetermined  Abnormal ECG  No previous ECGs available                     Assessment/Recommendations:      -Severe aortic stenosis: Here for assessment for TAVR risk benefits and alternatives of the procedure were discussed with the patient  Patient has back on her physical activities given that she has severe aortic stenosis and is somewhat symptomatic as well  I discussed with her the options, risk benefits of TAVR she will be undergoing a CTA today for further evaluation     -Patient also has history of PAF and is on Eliquis     -Hypertension patient on lisinopril and Lopressor     -Also has history of pulmonary sarcoidosis per records      CHART REVIEWED  PT EXAMINED  [facility] PT  RISKS BENEFITS AND ALERTNATIVES EXPLAINED IN DETAIL  CONSENT OBTANED               Karely Ryder MD, 3/29/2022 9:20 AM

## 2022-06-21 ENCOUNTER — HOSPITAL ENCOUNTER (OUTPATIENT)
Dept: CARDIAC CATH/INVASIVE PROCEDURES | Age: 67
Discharge: HOME | DRG: 267 | End: 2022-06-21
Attending: INTERNAL MEDICINE | Admitting: INTERNAL MEDICINE
Payer: MEDICARE

## 2022-06-21 LAB
HCT VFR BLD CALC: 42.4 % (ref 37–47)
HEMOGLOBIN: 14.2 GM/DL (ref 12.5–16)
MCH RBC QN AUTO: 29.5 PG (ref 27–31)
MCHC RBC AUTO-ENTMCNC: 33.5 % (ref 32–36)
MCV RBC AUTO: 88.1 FL (ref 78–100)
PDW BLD-RTO: 13.8 % (ref 11.7–14.9)
PLATELET # BLD: 89 K/CU MM (ref 140–440)
PMV BLD AUTO: 11.3 FL (ref 7.5–11.1)
RBC # BLD: 4.81 M/CU MM (ref 4.2–5.4)
WBC # BLD: 4.1 K/CU MM (ref 4–10.5)

## 2022-06-21 PROCEDURE — P9034 PLATELETS, PHERESIS: HCPCS

## 2022-06-21 PROCEDURE — 85027 COMPLETE CBC AUTOMATED: CPT

## 2022-06-21 PROCEDURE — 36415 COLL VENOUS BLD VENIPUNCTURE: CPT

## 2022-06-21 RX ORDER — NOREPINEPHRINE BIT/0.9 % NACL 16MG/250ML
1-100 INFUSION BOTTLE (ML) INTRAVENOUS
Status: DISPENSED | OUTPATIENT
Start: 2022-06-22 | End: 2022-06-22

## 2022-06-21 RX ORDER — SODIUM CHLORIDE 9 MG/ML
INJECTION, SOLUTION INTRAVENOUS PRN
Status: DISCONTINUED | OUTPATIENT
Start: 2022-06-21 | End: 2022-06-23 | Stop reason: HOSPADM

## 2022-06-21 NOTE — ANESTHESIA PRE PROCEDURE
Department of Anesthesiology  Preprocedure Note       Name:  Pricila Trent   Age:  74 y.o.  :  1955                                          MRN:  [id number]         Date:  2022      Surgeon: * Surgery not found *    Procedure:     Medications prior to admission:   Prior to Admission medications    Medication Sig Start Date End Date Taking? Authorizing Provider   aspirin 81 MG EC tablet Take 81 mg by mouth daily    Historical Provider, MD   metoprolol succinate (TOPROL XL) 25 MG extended release tablet Take 1 tablet by mouth daily 3/1/22   Michelle Steward MD   Spacer/Aero-Holding Chambers YUVAL 1 Device by Does not apply route daily as needed (use with albuterol rescue inhaler) 12/10/21   JAMIN Rene CNP   albuterol sulfate HFA (PROVENTIL HFA) 108 (90 Base) MCG/ACT inhaler Inhale 2 puffs into the lungs every 6 hours as needed for Wheezing 12/10/21 3/1/22  JAMIN Watts CNP   apixaban (ELIQUIS) 5 MG TABS tablet Take by mouth 2 times daily    Historical Provider, MD   lisinopril (PRINIVIL;ZESTRIL) 2.5 MG tablet Take by mouth daily 1/2 tab qam and 1/2 tab qhs    Historical Provider, MD   ursodiol (ACTIGALL) 300 MG capsule Take 1 capsule by mouth 4 times daily 21   Historical Provider, MD   Multiple Vitamins-Minerals (MULTIVITAMIN WOMEN 50+ PO) Take 1 tablet by mouth daily    Historical Provider, MD       Current medications:    No current outpatient medications on file. No current facility-administered medications for this encounter. Allergies:     Allergies   Allergen Reactions    Prednisone      Has a sensitivity  Other reaction(s): Joint Pain       Problem List:    Patient Active Problem List   Diagnosis Code    Coagulation defect ([facility] [address] D68.9    Nonrheumatic aortic valve stenosis I35.0       Past Medical History:        Diagnosis Date    Asthma     Hypertension     Obesity        Past Surgical History:        Procedure Laterality Date    TOTAL HIP ARTHROPLASTY         Social History:    Social History     Tobacco Use    Smoking status: Never Smoker    Smokeless tobacco: Never Used   Substance Use Topics    Alcohol use: Not Currently                                Counseling given: Not Answered      Vital Signs (Current): There were no vitals filed for this visit. BP Readings from Last 3 Encounters:   05/25/22 (!) 157/61   05/02/22 (!) 165/80   03/29/22 (!) 184/76       NPO Status:                                                                                 BMI:   Wt Readings from Last 3 Encounters:   05/25/22 197 lb (89.4 kg)   05/02/22 192 lb (87.1 kg)   03/29/22 198 lb (89.8 kg)     There is no height or weight on file to calculate BMI.    CBC:   Lab Results   Component Value Date    WBC 3.8 06/16/2022    RBC 4.77 06/16/2022    HGB 14.0 06/16/2022    HCT 42.8 06/16/2022    MCV 89.7 06/16/2022    RDW 13.7 06/16/2022    PLT 97 06/16/2022       CMP:   Lab Results   Component Value Date     06/16/2022    K 4.5 06/16/2022     06/16/2022    CO2 27 06/16/2022    BUN 16 06/16/2022    CREATININE 0.4 06/16/2022    GFRAA >60 06/16/2022    LABGLOM >60 06/16/2022    GLUCOSE 175 06/16/2022    PROT 7.5 06/16/2022    CALCIUM 9.3 06/16/2022    BILITOT 0.5 06/16/2022    ALKPHOS 134 06/16/2022    AST 31 06/16/2022    ALT 33 06/16/2022       POC Tests: No results for input(s): POCGLU, POCNA, POCK, POCCL, POCBUN, POCHEMO, POCHCT in the last 72 hours.     Coags:   Lab Results   Component Value Date    PROTIME 13.3 06/16/2022    INR 1.03 06/16/2022    APTT 28.1 06/16/2022       HCG (If Applicable): No results found for: PREGTESTUR, PREGSERUM, HCG, HCGQUANT     ABGs: No results found for: PHART, PO2ART, VIF9XUW, TXX5WWR, BEART, [id number]     Type & Screen (If Applicable):  No results found for: LABABO, LABRH    Drug/Infectious Status (If Applicable):  No results found for: HIV, HEPCAB    COVID-19 Screening (If Applicable): Lab Results   Component Value Date    COVID19 NOT DETECTED 03/29/2022    COVID19 NOT DETECTED 02/22/2022           Anesthesia Evaluation    Airway: Mallampati: I  TM distance: >3 FB   Neck ROM: full  Mouth opening: > = 3 FB   Dental: normal exam         Pulmonary:   (+) shortness of breath:  asthma:                           ROS comment:  pulmonary sarcoidosis    Cardiovascular:  Exercise tolerance: good (>4 METS),   (+) hypertension:, valvular problems/murmurs: AS, dysrhythmias: atrial fibrillation,       ECG reviewed  Rhythm: regular    Echocardiogram reviewed               ROS comment: ECHO on 03/01/22    1. No clot or thrombus noted in the left atrium, left appendage, and LV  cavity. 2.  Mild mitral regurgitation present. 3.  LV function preserved, greater than 55%. 4.  No ASD or PFO is noted. 5.  No pericardial effusion noted. 6.  Tricuspid valve, mitral valve, and pulmonic valve are grossly  normal.  7.  Aortic valve is sclerotic and severe aortic stenosis noted. 8.  Ascending aorta is normal.    Cath 03/01/22:   1. Unable to perform the right heart cath. Due to tortous upper arm vessels   2. Left main is patent. 3.  LAD, circ, ramus, and RCA has mild disease noted. 4.  Severe Aortic stenosis noted. Mean gradient around 45 mmHg noted across the aortic valve. PE comment: NSR @ 75 /min;hypertensive   Neuro/Psych:               GI/Hepatic/Renal:            ROS comment: Primary biliary cholangitis . Endo/Other:    (+) blood dyscrasia: anticoagulation therapy:., .                  ROS comment: Coagulation defect  Abdominal:             Vascular: Other Findings:           Anesthesia Plan      general     ASA 4     (Chart reviewed on 06/21/22)  Induction: intravenous. arterial line    Anesthetic plan and risks discussed with patient. Use of blood products discussed with patient whom.                      JAMIN Montiel - SWETHA   6/21/2022

## 2022-06-22 ENCOUNTER — ANESTHESIA (OUTPATIENT)
Dept: CARDIAC CATH/INVASIVE PROCEDURES | Age: 67
DRG: 267 | End: 2022-06-22
Payer: MEDICARE

## 2022-06-22 ENCOUNTER — HOSPITAL ENCOUNTER (INPATIENT)
Dept: CARDIAC CATH/INVASIVE PROCEDURES | Age: 67
LOS: 1 days | Discharge: HOME | DRG: 267 | End: 2022-06-23
Attending: INTERNAL MEDICINE | Admitting: INTERNAL MEDICINE
Payer: MEDICARE

## 2022-06-22 PROBLEM — I35.0 SEVERE AORTIC STENOSIS: Status: ACTIVE | Noted: 2022-06-22

## 2022-06-22 LAB
ACTIVATED CLOTTING TIME, LOW RANGE: 132 SEC
ACTIVATED CLOTTING TIME, LOW RANGE: 141 SEC
ACTIVATED CLOTTING TIME, LOW RANGE: 143 SEC
ACTIVATED CLOTTING TIME, LOW RANGE: 184 SEC
ACTIVATED CLOTTING TIME, LOW RANGE: 277 SEC
HCT VFR BLD CALC: 35.7 % (ref 37–47)
HEMOGLOBIN: 12 GM/DL (ref 12.5–16)
MCH RBC QN AUTO: 29.3 PG (ref 27–31)
MCHC RBC AUTO-ENTMCNC: 33.6 % (ref 32–36)
MCV RBC AUTO: 87.3 FL (ref 78–100)
PDW BLD-RTO: 13.8 % (ref 11.7–14.9)
PLATELET # BLD: 105 K/CU MM (ref 140–440)
PMV BLD AUTO: 11.5 FL (ref 7.5–11.1)
RBC # BLD: 4.09 M/CU MM (ref 4.2–5.4)
WBC # BLD: 7.4 K/CU MM (ref 4–10.5)

## 2022-06-22 PROCEDURE — 6360000002 HC RX W HCPCS: Performed by: INTERNAL MEDICINE

## 2022-06-22 PROCEDURE — 6370000000 HC RX 637 (ALT 250 FOR IP): Performed by: INTERNAL MEDICINE

## 2022-06-22 PROCEDURE — C1760 CLOSURE DEV, VASC: HCPCS

## 2022-06-22 PROCEDURE — 2000000000 HC ICU R&B

## 2022-06-22 PROCEDURE — 6370000000 HC RX 637 (ALT 250 FOR IP)

## 2022-06-22 PROCEDURE — 36620 INSERTION CATHETER ARTERY: CPT

## 2022-06-22 PROCEDURE — 2720000010 HC SURG SUPPLY STERILE

## 2022-06-22 PROCEDURE — C1725 CATH, TRANSLUMIN NON-LASER: HCPCS

## 2022-06-22 PROCEDURE — 6360000002 HC RX W HCPCS

## 2022-06-22 PROCEDURE — 3700000000 HC ANESTHESIA ATTENDED CARE

## 2022-06-22 PROCEDURE — 6360000004 HC RX CONTRAST MEDICATION

## 2022-06-22 PROCEDURE — 33361 REPLACE AORTIC VALVE PERQ: CPT

## 2022-06-22 PROCEDURE — 2709999900 HC NON-CHARGEABLE SUPPLY

## 2022-06-22 PROCEDURE — B246YZZ ULTRASONOGRAPHY OF RIGHT AND LEFT HEART USING OTHER CONTRAST: ICD-10-PCS | Performed by: INTERNAL MEDICINE

## 2022-06-22 PROCEDURE — 2580000003 HC RX 258: Performed by: INTERNAL MEDICINE

## 2022-06-22 PROCEDURE — 51702 INSERT TEMP BLADDER CATH: CPT

## 2022-06-22 PROCEDURE — 2580000003 HC RX 258

## 2022-06-22 PROCEDURE — 2500000003 HC RX 250 WO HCPCS

## 2022-06-22 PROCEDURE — 02RF38Z REPLACEMENT OF AORTIC VALVE WITH ZOOPLASTIC TISSUE, PERCUTANEOUS APPROACH: ICD-10-PCS | Performed by: INTERNAL MEDICINE

## 2022-06-22 PROCEDURE — 85027 COMPLETE CBC AUTOMATED: CPT

## 2022-06-22 PROCEDURE — B4101ZZ FLUOROSCOPY OF ABDOMINAL AORTA USING LOW OSMOLAR CONTRAST: ICD-10-PCS | Performed by: INTERNAL MEDICINE

## 2022-06-22 PROCEDURE — 3700000001 HC ADD 15 MINUTES (ANESTHESIA)

## 2022-06-22 PROCEDURE — 2780000006 HC MISC HEART VALVE

## 2022-06-22 PROCEDURE — 33361 REPLACE AORTIC VALVE PERQ: CPT | Performed by: INTERNAL MEDICINE

## 2022-06-22 PROCEDURE — 99232 SBSQ HOSP IP/OBS MODERATE 35: CPT | Performed by: THORACIC SURGERY (CARDIOTHORACIC VASCULAR SURGERY)

## 2022-06-22 PROCEDURE — 93308 TTE F-UP OR LMTD: CPT

## 2022-06-22 PROCEDURE — C1894 INTRO/SHEATH, NON-LASER: HCPCS

## 2022-06-22 PROCEDURE — 86901 BLOOD TYPING SEROLOGIC RH(D): CPT

## 2022-06-22 PROCEDURE — C1769 GUIDE WIRE: HCPCS

## 2022-06-22 PROCEDURE — 85347 COAGULATION TIME ACTIVATED: CPT

## 2022-06-22 PROCEDURE — 6360000002 HC RX W HCPCS: Performed by: ANESTHESIOLOGY

## 2022-06-22 PROCEDURE — 86900 BLOOD TYPING SEROLOGIC ABO: CPT

## 2022-06-22 RX ORDER — SODIUM CHLORIDE 9 MG/ML
25 INJECTION, SOLUTION INTRAVENOUS PRN
Status: CANCELLED | OUTPATIENT
Start: 2022-06-22

## 2022-06-22 RX ORDER — ASPIRIN 81 MG/1
81 TABLET ORAL DAILY
Status: DISCONTINUED | OUTPATIENT
Start: 2022-06-22 | End: 2022-06-22 | Stop reason: SDUPTHER

## 2022-06-22 RX ORDER — HYDRALAZINE HYDROCHLORIDE 20 MG/ML
INJECTION INTRAMUSCULAR; INTRAVENOUS PRN
Status: DISCONTINUED | OUTPATIENT
Start: 2022-06-22 | End: 2022-06-22 | Stop reason: SDUPTHER

## 2022-06-22 RX ORDER — ONDANSETRON 2 MG/ML
4 INJECTION INTRAMUSCULAR; INTRAVENOUS EVERY 6 HOURS PRN
Status: DISCONTINUED | OUTPATIENT
Start: 2022-06-22 | End: 2022-06-23 | Stop reason: HOSPADM

## 2022-06-22 RX ORDER — PROCHLORPERAZINE EDISYLATE 5 MG/ML
5 INJECTION INTRAMUSCULAR; INTRAVENOUS
Status: CANCELLED | OUTPATIENT
Start: 2022-06-22 | End: 2022-06-22

## 2022-06-22 RX ORDER — LABETALOL HYDROCHLORIDE 5 MG/ML
10 INJECTION, SOLUTION INTRAVENOUS EVERY 30 MIN PRN
Status: DISCONTINUED | OUTPATIENT
Start: 2022-06-22 | End: 2022-06-23 | Stop reason: HOSPADM

## 2022-06-22 RX ORDER — SODIUM CHLORIDE 0.9 % (FLUSH) 0.9 %
5-40 SYRINGE (ML) INJECTION EVERY 12 HOURS SCHEDULED
Status: CANCELLED | OUTPATIENT
Start: 2022-06-22

## 2022-06-22 RX ORDER — SODIUM CHLORIDE 9 MG/ML
INJECTION, SOLUTION INTRAVENOUS PRN
Status: DISCONTINUED | OUTPATIENT
Start: 2022-06-22 | End: 2022-06-23 | Stop reason: HOSPADM

## 2022-06-22 RX ORDER — ASPIRIN 81 MG/1
81 TABLET ORAL DAILY
Status: DISCONTINUED | OUTPATIENT
Start: 2022-06-23 | End: 2022-06-23 | Stop reason: HOSPADM

## 2022-06-22 RX ORDER — OXYCODONE HYDROCHLORIDE 5 MG/1
5 TABLET ORAL
Status: CANCELLED | OUTPATIENT
Start: 2022-06-22 | End: 2022-06-22

## 2022-06-22 RX ORDER — SODIUM CHLORIDE 0.9 % (FLUSH) 0.9 %
5-40 SYRINGE (ML) INJECTION PRN
Status: DISCONTINUED | OUTPATIENT
Start: 2022-06-22 | End: 2022-06-23 | Stop reason: HOSPADM

## 2022-06-22 RX ORDER — BUPIVACAINE HYDROCHLORIDE 2.5 MG/ML
30 INJECTION, SOLUTION EPIDURAL; INFILTRATION; INTRACAUDAL ONCE
Status: DISCONTINUED | OUTPATIENT
Start: 2022-06-22 | End: 2022-06-23 | Stop reason: HOSPADM

## 2022-06-22 RX ORDER — IPRATROPIUM BROMIDE AND ALBUTEROL SULFATE 2.5; .5 MG/3ML; MG/3ML
1 SOLUTION RESPIRATORY (INHALATION)
Status: CANCELLED | OUTPATIENT
Start: 2022-06-22 | End: 2022-06-22

## 2022-06-22 RX ORDER — SODIUM CHLORIDE, SODIUM LACTATE, POTASSIUM CHLORIDE, CALCIUM CHLORIDE 600; 310; 30; 20 MG/100ML; MG/100ML; MG/100ML; MG/100ML
INJECTION, SOLUTION INTRAVENOUS CONTINUOUS
Status: CANCELLED | OUTPATIENT
Start: 2022-06-22

## 2022-06-22 RX ORDER — DIPHENHYDRAMINE HYDROCHLORIDE 50 MG/ML
12.5 INJECTION INTRAMUSCULAR; INTRAVENOUS
Status: CANCELLED | OUTPATIENT
Start: 2022-06-22 | End: 2022-06-22

## 2022-06-22 RX ORDER — HEPARIN SODIUM 10000 [USP'U]/ML
INJECTION, SOLUTION INTRAVENOUS; SUBCUTANEOUS PRN
Status: DISCONTINUED | OUTPATIENT
Start: 2022-06-22 | End: 2022-06-22 | Stop reason: SDUPTHER

## 2022-06-22 RX ORDER — HYDRALAZINE HYDROCHLORIDE 20 MG/ML
10 INJECTION INTRAMUSCULAR; INTRAVENOUS EVERY 10 MIN PRN
Status: DISCONTINUED | OUTPATIENT
Start: 2022-06-22 | End: 2022-06-23 | Stop reason: HOSPADM

## 2022-06-22 RX ORDER — MEPERIDINE HYDROCHLORIDE 25 MG/ML
12.5 INJECTION INTRAMUSCULAR; INTRAVENOUS; SUBCUTANEOUS ONCE
Status: CANCELLED | OUTPATIENT
Start: 2022-06-22 | End: 2022-06-22

## 2022-06-22 RX ORDER — HYDRALAZINE HYDROCHLORIDE 20 MG/ML
10 INJECTION INTRAMUSCULAR; INTRAVENOUS
Status: CANCELLED | OUTPATIENT
Start: 2022-06-22

## 2022-06-22 RX ORDER — PROPOFOL 10 MG/ML
INJECTION, EMULSION INTRAVENOUS CONTINUOUS PRN
Status: DISCONTINUED | OUTPATIENT
Start: 2022-06-22 | End: 2022-06-22 | Stop reason: SDUPTHER

## 2022-06-22 RX ORDER — ASPIRIN 81 MG/1
81 TABLET, CHEWABLE ORAL DAILY
Status: DISCONTINUED | OUTPATIENT
Start: 2022-06-23 | End: 2022-06-22 | Stop reason: SDUPTHER

## 2022-06-22 RX ORDER — LISINOPRIL 20 MG/1
20 TABLET ORAL DAILY
Status: DISCONTINUED | OUTPATIENT
Start: 2022-06-22 | End: 2022-06-23 | Stop reason: HOSPADM

## 2022-06-22 RX ORDER — BISACODYL 10 MG
10 SUPPOSITORY, RECTAL RECTAL DAILY PRN
Status: DISCONTINUED | OUTPATIENT
Start: 2022-06-22 | End: 2022-06-23 | Stop reason: HOSPADM

## 2022-06-22 RX ORDER — DEXTROSE MONOHYDRATE 50 MG/ML
100 INJECTION, SOLUTION INTRAVENOUS PRN
Status: CANCELLED | OUTPATIENT
Start: 2022-06-22

## 2022-06-22 RX ORDER — MIDAZOLAM HYDROCHLORIDE 1 MG/ML
INJECTION INTRAMUSCULAR; INTRAVENOUS PRN
Status: DISCONTINUED | OUTPATIENT
Start: 2022-06-22 | End: 2022-06-22 | Stop reason: SDUPTHER

## 2022-06-22 RX ORDER — METOPROLOL SUCCINATE 50 MG/1
50 TABLET, EXTENDED RELEASE ORAL DAILY
Status: DISCONTINUED | OUTPATIENT
Start: 2022-06-23 | End: 2022-06-23 | Stop reason: HOSPADM

## 2022-06-22 RX ORDER — ALBUTEROL SULFATE 90 UG/1
2 AEROSOL, METERED RESPIRATORY (INHALATION) EVERY 6 HOURS PRN
Status: DISCONTINUED | OUTPATIENT
Start: 2022-06-22 | End: 2022-06-23 | Stop reason: HOSPADM

## 2022-06-22 RX ORDER — FENTANYL CITRATE 50 UG/ML
INJECTION, SOLUTION INTRAMUSCULAR; INTRAVENOUS PRN
Status: DISCONTINUED | OUTPATIENT
Start: 2022-06-22 | End: 2022-06-22 | Stop reason: SDUPTHER

## 2022-06-22 RX ORDER — SODIUM CHLORIDE 0.9 % (FLUSH) 0.9 %
5-40 SYRINGE (ML) INJECTION EVERY 12 HOURS SCHEDULED
Status: DISCONTINUED | OUTPATIENT
Start: 2022-06-22 | End: 2022-06-23 | Stop reason: HOSPADM

## 2022-06-22 RX ORDER — SODIUM CHLORIDE 0.9 % (FLUSH) 0.9 %
5-40 SYRINGE (ML) INJECTION PRN
Status: CANCELLED | OUTPATIENT
Start: 2022-06-22

## 2022-06-22 RX ORDER — AMLODIPINE BESYLATE 5 MG/1
5 TABLET ORAL DAILY
Status: DISCONTINUED | OUTPATIENT
Start: 2022-06-22 | End: 2022-06-23 | Stop reason: HOSPADM

## 2022-06-22 RX ORDER — ONDANSETRON 2 MG/ML
4 INJECTION INTRAMUSCULAR; INTRAVENOUS
Status: CANCELLED | OUTPATIENT
Start: 2022-06-22 | End: 2022-06-22

## 2022-06-22 RX ORDER — PROTAMINE SULFATE 10 MG/ML
INJECTION, SOLUTION INTRAVENOUS PRN
Status: DISCONTINUED | OUTPATIENT
Start: 2022-06-22 | End: 2022-06-22 | Stop reason: SDUPTHER

## 2022-06-22 RX ORDER — CEFAZOLIN SODIUM 2 G/100ML
2000 INJECTION, SOLUTION INTRAVENOUS ONCE
Status: COMPLETED | OUTPATIENT
Start: 2022-06-22 | End: 2022-06-22

## 2022-06-22 RX ORDER — ACETAMINOPHEN 325 MG/1
650 TABLET ORAL EVERY 4 HOURS PRN
Status: DISCONTINUED | OUTPATIENT
Start: 2022-06-22 | End: 2022-06-23 | Stop reason: HOSPADM

## 2022-06-22 RX ORDER — FENTANYL CITRATE 50 UG/ML
50 INJECTION, SOLUTION INTRAMUSCULAR; INTRAVENOUS EVERY 5 MIN PRN
Status: CANCELLED | OUTPATIENT
Start: 2022-06-22

## 2022-06-22 RX ORDER — URSODIOL 300 MG/1
300 CAPSULE ORAL 4 TIMES DAILY
Status: DISCONTINUED | OUTPATIENT
Start: 2022-06-22 | End: 2022-06-23 | Stop reason: HOSPADM

## 2022-06-22 RX ORDER — METOPROLOL SUCCINATE 25 MG/1
25 TABLET, EXTENDED RELEASE ORAL DAILY
Status: DISCONTINUED | OUTPATIENT
Start: 2022-06-22 | End: 2022-06-22

## 2022-06-22 RX ORDER — LORAZEPAM 2 MG/ML
0.5 INJECTION INTRAMUSCULAR
Status: CANCELLED | OUTPATIENT
Start: 2022-06-22 | End: 2022-06-22

## 2022-06-22 RX ADMIN — SODIUM CHLORIDE, PRESERVATIVE FREE 10 ML: 5 INJECTION INTRAVENOUS at 21:48

## 2022-06-22 RX ADMIN — SODIUM CHLORIDE, PRESERVATIVE FREE 10 ML: 5 INJECTION INTRAVENOUS at 11:10

## 2022-06-22 RX ADMIN — HYDRALAZINE HYDROCHLORIDE 10 MG: 20 INJECTION INTRAMUSCULAR; INTRAVENOUS at 11:03

## 2022-06-22 RX ADMIN — AMLODIPINE BESYLATE 5 MG: 5 TABLET ORAL at 11:37

## 2022-06-22 RX ADMIN — FENTANYL CITRATE 25 MCG: 50 INJECTION, SOLUTION INTRAMUSCULAR; INTRAVENOUS at 08:26

## 2022-06-22 RX ADMIN — MIDAZOLAM 0.5 MG: 1 INJECTION INTRAMUSCULAR; INTRAVENOUS at 09:05

## 2022-06-22 RX ADMIN — PROTAMINE SULFATE 25 MG: 10 INJECTION, SOLUTION INTRAVENOUS at 09:18

## 2022-06-22 RX ADMIN — MIDAZOLAM 0.5 MG: 1 INJECTION INTRAMUSCULAR; INTRAVENOUS at 08:54

## 2022-06-22 RX ADMIN — PROPOFOL 100 MCG/KG/MIN: 10 INJECTION, EMULSION INTRAVENOUS at 07:35

## 2022-06-22 RX ADMIN — ACETAMINOPHEN 650 MG: 325 TABLET ORAL at 21:47

## 2022-06-22 RX ADMIN — FENTANYL CITRATE 25 MCG: 50 INJECTION, SOLUTION INTRAMUSCULAR; INTRAVENOUS at 08:39

## 2022-06-22 RX ADMIN — HEPARIN SODIUM 2000 UNITS: 10000 INJECTION INTRAVENOUS; SUBCUTANEOUS at 08:37

## 2022-06-22 RX ADMIN — HYDRALAZINE HYDROCHLORIDE 10 MG: 20 INJECTION, SOLUTION INTRAMUSCULAR; INTRAVENOUS at 09:48

## 2022-06-22 RX ADMIN — URSODIOL 300 MG: 300 CAPSULE ORAL at 21:52

## 2022-06-22 RX ADMIN — MIDAZOLAM 2 MG: 1 INJECTION INTRAMUSCULAR; INTRAVENOUS at 07:15

## 2022-06-22 RX ADMIN — CEFAZOLIN SODIUM 2000 MG: 2 INJECTION, SOLUTION INTRAVENOUS at 07:24

## 2022-06-22 RX ADMIN — METOPROLOL SUCCINATE 25 MG: 25 TABLET, EXTENDED RELEASE ORAL at 11:36

## 2022-06-22 RX ADMIN — HYDRALAZINE HYDROCHLORIDE 10 MG: 20 INJECTION, SOLUTION INTRAMUSCULAR; INTRAVENOUS at 09:36

## 2022-06-22 RX ADMIN — HEPARIN SODIUM 7000 UNITS: 10000 INJECTION INTRAVENOUS; SUBCUTANEOUS at 08:29

## 2022-06-22 RX ADMIN — MIDAZOLAM 1 MG: 1 INJECTION INTRAMUSCULAR; INTRAVENOUS at 08:26

## 2022-06-22 RX ADMIN — FENTANYL CITRATE 50 MCG: 50 INJECTION, SOLUTION INTRAMUSCULAR; INTRAVENOUS at 07:57

## 2022-06-22 RX ADMIN — LISINOPRIL 20 MG: 20 TABLET ORAL at 11:37

## 2022-06-22 RX ADMIN — URSODIOL 300 MG: 300 CAPSULE ORAL at 16:49

## 2022-06-22 RX ADMIN — ONDANSETRON 4 MG: 2 INJECTION INTRAMUSCULAR; INTRAVENOUS at 11:02

## 2022-06-22 ASSESSMENT — PAIN DESCRIPTION - ORIENTATION: ORIENTATION: MID

## 2022-06-22 ASSESSMENT — PAIN SCALES - GENERAL: PAINLEVEL_OUTOF10: 3

## 2022-06-22 ASSESSMENT — PAIN DESCRIPTION - DESCRIPTORS: DESCRIPTORS: ACHING

## 2022-06-22 ASSESSMENT — ENCOUNTER SYMPTOMS: SHORTNESS OF BREATH: 1

## 2022-06-22 ASSESSMENT — PAIN - FUNCTIONAL ASSESSMENT: PAIN_FUNCTIONAL_ASSESSMENT: PREVENTS OR INTERFERES SOME ACTIVE ACTIVITIES AND ADLS

## 2022-06-22 ASSESSMENT — PAIN DESCRIPTION - LOCATION: LOCATION: HEAD

## 2022-06-22 NOTE — PROGRESS NOTES
Order received to DC left femoral and right radial radial art lines. Leana with positive blood return and flushed easily. Line pulled, catheter intact. Manual pressure held x 5 minutes for radial and 30 minutes for femoral.  Stasis achieved. No redness, bleeding, bruising, or hematoma noted. Dressing applied. Pt tolerated well. Will continue to monitor.

## 2022-06-22 NOTE — PROGRESS NOTES
Dr. Cristina Mendenhall and Dr. Ca Madison at patient bedside assessing blood pressure. Arterial line in right radial is 190/56 (101). New order for 5 mg oral of Amlodipine.

## 2022-06-22 NOTE — PROGRESS NOTES
Dr. Giuseppe Lantigua at patient bedside new verbal order to discontinue left femoral art line and right radial art line.  ACt - 136, Artline BP- 140/ 48 (78)

## 2022-06-22 NOTE — PLAN OF CARE
Problem: Discharge Planning  Goal: Discharge to home or other facility with appropriate resources  Outcome: Progressing  Flowsheets (Taken 6/22/2022 1901)  Discharge to home or other facility with appropriate resources: Identify barriers to discharge with patient and caregiver     Problem: Skin/Tissue Integrity  Goal: Absence of new skin breakdown  Description: 1. Monitor for areas of redness and/or skin breakdown  2. Assess vascular access sites hourly  3. Every 4-6 hours minimum:  Change oxygen saturation probe site  4. Every 4-6 hours:  If on nasal continuous positive airway pressure, respiratory therapy assess nares and determine need for appliance change or resting period.   Outcome: Progressing

## 2022-06-22 NOTE — PROGRESS NOTES
Bedside report given to Tiera Torres RN from this nurse. Dr. Hayden Day at patient bedside to assess bilateral groin sites and pulses. Bilateral groins are clean, dry intact- no bleeding, hematoma, nor bruising.

## 2022-06-22 NOTE — PROGRESS NOTES
Cardiothoracic Surgery Note      Name:  Mary Ku /Age/Sex: 1955  (74 y.o. female)   MRN & CSN:  [id number] & [id number] Admission Date/Time: 2022  5:39 AM   Location:  Cath Lab/NONE PCP: Melvi Ervin MD       Hospital Day: 1      -CTS consult is for:  Severe Aortic disease    -  Interval history:  Ana Rosa-op optimization of thrombocytopenia    · ASSESSMENT/ PLAN:  1. TAVR today  2. Seen by hematology pre operatively for thrombocytopenia. Administered platetet transfusion today for goal of > 100K. Hx of cirrhosis, prmiary biliary cholangitis. 3. I have met with patient and family bedside. Explained risks, benefits, and alternatives regarding if the need for surgical AVR arises. She expressed understanding and would like to proceed with TAVR as planned. Subjective:  Louis Medina is a 74 y. o.year old who and presents with had no chief complaint listed for this encounter. No chief complaint on file. Objective: Temperature:  Current - Temp: (!) 96.2 °F (35.7 °C);  Max - Temp  Av.2 °F (35.7 °C)  Min: 96.2 °F (35.7 °C)  Max: 96.2 °F (35.7 °C)    Respiratory Rate : Resp  Av  Min: 18  Max: 18    Pulse Range: Pulse  Av.5  Min: 77  Max: 80    Blood Presuure Range:  Systolic (73BOY), QIV:670 , Min:172 , VEQ:960   ; Diastolic (32KKK), COS:84, Min:78, Max:78      Pulse ox Range: SpO2  Av %  Min: 100 %  Max: 100 %    24hr I & O:  No intake or output data in the 24 hours ending 22 0706              Review of Systems:    Constitutional: No Fever, chills change in appetite,  or drastic weight loss/gain   Eyes: No changes in vision  ENT: No Headaches, Hearing Loss or Vertigo  Cardiovascular: No chest pain, chest tightness, palpitations, lower extremity edema, or dyspnea on exertion  Respiratory: No shortness of breath or cough  Gastrointestinal: No abdominal pain, appetite loss, blood in stools, constipation, diarrhea or heartburn  Genitourinary: No dysuria, trouble voiding, or hematuria  Musculoskeletal:  No gait disturbance, weakness or joint complaints  Integumentary: No rash, pallor, or skin lesions  Neurological: No lightheadedness, dizziness, seizure, or loss of consciousness  Psychiatric: No anxiety or depression  Endocrine: No malaise, fatigue or temperature intolerance  Hematologic/Lymphatic: No bleeding problems, blood clots or swollen lymph nodes    All systems negative except as marked. TELEMETRY: Sinus      has a past medical history of Asthma, Hypertension, and Obesity. has a past surgical history that includes Total hip arthroplasty. Physical Exam:  BP (!) 172/78   Pulse 77   Temp (!) 96.2 °F (35.7 °C) (Temporal)   Resp 18   Ht 5' 3\" (1.6 m)   Wt 197 lb (89.4 kg)   SpO2 100%   BMI 34.90 kg/m²       Constitutional:  Well developed, Well nourished, No acute distress, Non-toxic appearance. HENT:  Normocephalic, Atraumatic, Bilateral external ears normal, Oropharynx moist, No oral exudates, Neck- Normal range of motion, No tenderness, Supple, No stridor  Eyes:  Pupils equal and round, extraocular muscles intact. Conjunctiva normal, No discharge. Respiratory:  Clear to auscultation,No wheezing or crackles, no signs of respiratory distress   Cardiovascular: Regular Rhythm and rate,S1 and S2 audible, No murmur, rub or gallop, No evidence of JVD. Pulses 2+, no carotid bruit  GI:  Soft, No tenderness, No masses, No gross visceromegaly   :  No costovertebral angle tenderness   Musculoskeletal:  no edema present, no tenderness, no deformities. Integument:  Well hydrated, no rash   Lymphatic:  No lymphadenopathy noted   Neurologic:  Alert & oriented x 3. Can move all four appendages separately.    Psychiatric:  Speech and behavior appropriate      Medications:    ceFAZolin  2,000 mg IntraVENous Once    bupivacaine (PF)  30 mL IntraDERmal Once    heparin (porcine)  30,000 Units IntraVENous On Call to OR

## 2022-06-22 NOTE — OP NOTE
Procedure : Cutaneous femoral access  aortic valve replacement ( TAVR)                      Using Medtronic Core Valve    Indication : Severe Aortic stenosis with heart failure symptoms and class III angina    Operators : Dr Raisa Mayfield / Dr Socrates Garner /                          Procedure description : Patient was brought to the operating Cath Lab hybrid room. Anesthesia performed anesthesia using moderate sedation   Thoracic echo was used to identify the aortic valve and findings noted. The patient was appropriately draped and prepped. Left femoral arterial access was obtained using ultrasound guidance using Seldinger technique. 6 Kazakh sheath was placed in the left femoral artery venous access was obtained in the left femoral vein using ultrasound guidance. We then advanced a 6 Western [place] temporary pacer wire into the right ventricle pacing was confirmed. We then obtained arterial access using Seldinger technique under ultrasound guidance of the right femoral arteries and a 6 Kazakh sheath was placed in right femoral artery . Right femoral site was prepared Perclose were placed. Right femoral 6 Kazakh sheath was removed. After serial dilatation sheath size was increased to 14 Western [place]. A 6 Kazakh pigtail was then advanced and positioned into the right noncoronary cusp for serial aortograms. AL-1 diagnostic catheter was used to advance a straight wire and aortic valve was crossed. We then exchanged for an exchange length J-wire and a pigtail was placed into the left ventricle simultaneous pressures were recorded gradient confirmed. Core valve was checked under fluoroscopy. At this point a second timeout was completed and patient was given 6000 units of heparin . We then used to confide a wire which was positioned into the left ventricle. Core valve delivery system was then advanced over the confida wire. Aortic valve was crossed in an Czech projection multiple angles were taken.   Using fluoroscopy guidance position was confirmed using serial fluoroscopic angiograms    Using fluoroscopy guidance prosthesis was deployed at 80% deployment aortogram was repeated. After confirming the prosthesis position Medtronic device was implanted. Transthoracic echo and aortogram was used to confirm no significant paravalvular leak. EBL of  70 cc    Simultaneous pressures were again recorded.   Right groin site was closed with percutaneous peripheral angiogram was confirmed significant no significant stenosis  Left femoral sheath was left in remove later  Pt was transferred out to ICU

## 2022-06-22 NOTE — PROGRESS NOTES
Patient arrived to room 2101 awake and alert in bed. Two nurses - this nurse and Ivone Che RN did skin assessment is scattered bruising, moderate size bruising in right flank, right groin bandage is clean, dry and intact. Left groin has sheath in place with artline /64 (106). Vital signs - HR normal sinus rhythm 88, O2- 100 % on room air, RR- 13, Temp- 98.4. IV's saline locked. Pedal pulses are +3, radial pulses +2. Will continue to monitor.

## 2022-06-22 NOTE — CONSENT
Informed Consent for Blood Component Transfusion Note    I have discussed with the patient the rationale for blood component transfusion; its benefits in treating or preventing fatigue, organ damage, or death; and its risk which includes mild transfusion reactions, rare risk of blood borne infection, or more serious but rare reactions. I have discussed the alternatives to transfusion, including the risk and consequences of not receiving transfusion. The patient had an opportunity to ask questions and had agreed to proceed with transfusion of blood components.     Electronically signed by Keyla Ruiz MD on 6/22/22 at 2:00 PM EDT

## 2022-06-22 NOTE — PROGRESS NOTES
Pt arrived to dept via EMS. Pt c/o chesty pain starting today and dyspnea x 2 weeks. Pt reports no pain at the moment but states that when he was experiencing it it was on the right side and a pressure sensation that did not radiate. Pt also reports his pulse this morning to be 125 at the highest this morning while at home. Pt denies N/V or diarrhea. Pt reports sporadic instances of dizziness. Pt awake, alert and oriented x 4. Skin warm and dry/normal color for ethnicity. Resp easy and unlabored. Pt placed in gown and on cardiac monitor. Call light in reach. Will continue to monitor. To room 1 assessment complete per flowsheet

## 2022-06-23 ENCOUNTER — APPOINTMENT (OUTPATIENT)
Dept: GENERAL RADIOLOGY | Age: 67
DRG: 267 | End: 2022-06-23
Attending: INTERNAL MEDICINE
Payer: MEDICARE

## 2022-06-23 VITALS
BODY MASS INDEX: 35.66 KG/M2 | HEART RATE: 78 BPM | DIASTOLIC BLOOD PRESSURE: 54 MMHG | TEMPERATURE: 99.1 F | RESPIRATION RATE: 20 BRPM | HEIGHT: 63 IN | OXYGEN SATURATION: 94 % | WEIGHT: 201.28 LBS | SYSTOLIC BLOOD PRESSURE: 170 MMHG

## 2022-06-23 LAB
ANION GAP SERPL CALCULATED.3IONS-SCNC: 8 MMOL/L (ref 4–16)
BUN BLDV-MCNC: 9 MG/DL (ref 6–23)
CALCIUM SERPL-MCNC: 8.6 MG/DL (ref 8.3–10.6)
CHLORIDE BLD-SCNC: 105 MMOL/L (ref 99–110)
CO2: 24 MMOL/L (ref 21–32)
COMPONENT: NORMAL
CREAT SERPL-MCNC: 0.4 MG/DL (ref 0.6–1.1)
EKG ATRIAL RATE: 79 BPM
EKG DIAGNOSIS: NORMAL
EKG P AXIS: 37 DEGREES
EKG P-R INTERVAL: 186 MS
EKG Q-T INTERVAL: 390 MS
EKG QRS DURATION: 94 MS
EKG QTC CALCULATION (BAZETT): 447 MS
EKG R AXIS: -23 DEGREES
EKG T AXIS: 36 DEGREES
EKG VENTRICULAR RATE: 79 BPM
GFR AFRICAN AMERICAN: >60 ML/MIN/1.73M2
GFR NON-AFRICAN AMERICAN: >60 ML/MIN/1.73M2
GLUCOSE BLD-MCNC: 161 MG/DL (ref 70–99)
HCT VFR BLD CALC: 34.1 % (ref 37–47)
HEMOGLOBIN: 11.4 GM/DL (ref 12.5–16)
LV EF: 58 %
LVEF MODALITY: NORMAL
MCH RBC QN AUTO: 29.4 PG (ref 27–31)
MCHC RBC AUTO-ENTMCNC: 33.4 % (ref 32–36)
MCV RBC AUTO: 87.9 FL (ref 78–100)
PDW BLD-RTO: 14.5 % (ref 11.7–14.9)
PLATELET # BLD: 81 K/CU MM (ref 140–440)
PMV BLD AUTO: 11.7 FL (ref 7.5–11.1)
POTASSIUM SERPL-SCNC: 3.9 MMOL/L (ref 3.5–5.1)
RBC # BLD: 3.88 M/CU MM (ref 4.2–5.4)
SODIUM BLD-SCNC: 137 MMOL/L (ref 135–145)
STATUS: NORMAL
TRANSFUSION STATUS: NORMAL
UNIT DIVISION: 0
UNIT NUMBER: NORMAL
WBC # BLD: 7.6 K/CU MM (ref 4–10.5)

## 2022-06-23 PROCEDURE — 85027 COMPLETE CBC AUTOMATED: CPT

## 2022-06-23 PROCEDURE — 99238 HOSP IP/OBS DSCHRG MGMT 30/<: CPT | Performed by: INTERNAL MEDICINE

## 2022-06-23 PROCEDURE — 97165 OT EVAL LOW COMPLEX 30 MIN: CPT

## 2022-06-23 PROCEDURE — 80048 BASIC METABOLIC PNL TOTAL CA: CPT

## 2022-06-23 PROCEDURE — 93005 ELECTROCARDIOGRAM TRACING: CPT | Performed by: INTERNAL MEDICINE

## 2022-06-23 PROCEDURE — 71045 X-RAY EXAM CHEST 1 VIEW: CPT

## 2022-06-23 PROCEDURE — 94761 N-INVAS EAR/PLS OXIMETRY MLT: CPT

## 2022-06-23 PROCEDURE — 36415 COLL VENOUS BLD VENIPUNCTURE: CPT

## 2022-06-23 PROCEDURE — 97162 PT EVAL MOD COMPLEX 30 MIN: CPT

## 2022-06-23 PROCEDURE — 6370000000 HC RX 637 (ALT 250 FOR IP): Performed by: INTERNAL MEDICINE

## 2022-06-23 PROCEDURE — 97116 GAIT TRAINING THERAPY: CPT

## 2022-06-23 PROCEDURE — 93306 TTE W/DOPPLER COMPLETE: CPT

## 2022-06-23 PROCEDURE — 93010 ELECTROCARDIOGRAM REPORT: CPT | Performed by: INTERNAL MEDICINE

## 2022-06-23 RX ORDER — AMLODIPINE BESYLATE 5 MG/1
5 TABLET ORAL DAILY
Qty: 30 TABLET | Refills: 3 | Status: SHIPPED | OUTPATIENT
Start: 2022-06-23

## 2022-06-23 RX ADMIN — LISINOPRIL 20 MG: 20 TABLET ORAL at 10:06

## 2022-06-23 RX ADMIN — METOPROLOL SUCCINATE 50 MG: 50 TABLET, EXTENDED RELEASE ORAL at 10:07

## 2022-06-23 RX ADMIN — URSODIOL 300 MG: 300 CAPSULE ORAL at 10:07

## 2022-06-23 RX ADMIN — ASPIRIN 81 MG: 81 TABLET, COATED ORAL at 10:07

## 2022-06-23 RX ADMIN — AMLODIPINE BESYLATE 5 MG: 5 TABLET ORAL at 10:07

## 2022-06-23 NOTE — PLAN OF CARE
Problem: Discharge Planning  Goal: Discharge to home or other facility with appropriate resources  6/23/2022 0834 by Tanya Madrid. Minda Campbell RN  Outcome: Progressing  6/22/2022 1904 by Silvana Butcher. ELAINE Espinoza  Outcome: Progressing  Flowsheets (Taken 6/22/2022 1901)  Discharge to home or other facility with appropriate resources: Identify barriers to discharge with patient and caregiver     Problem: Skin/Tissue Integrity  Goal: Absence of new skin breakdown  Description: 1. Monitor for areas of redness and/or skin breakdown  2. Assess vascular access sites hourly  3. Every 4-6 hours minimum:  Change oxygen saturation probe site  4. Every 4-6 hours:  If on nasal continuous positive airway pressure, respiratory therapy assess nares and determine need for appliance change or resting period. 6/23/2022 0834 by Tanya Campbell RN  Outcome: Progressing  6/22/2022 1904 by Silvana Butcher.  Georges Frazier RN  Outcome: Progressing

## 2022-06-23 NOTE — FLOWSHEET NOTE
Arcelia rothman [facility] is calling regarding patient having a rash on back of neck and collar area. Please call Arcelia to discuss at [phone].     Pt. Becoming increasingly agitated and confused again. Reoriented to time and place but does not seem to be working at this time.

## 2022-06-23 NOTE — PROGRESS NOTES
Occupational Therapy  [place] [address] ACUTE CARE OCCUPATIONAL THERAPY EVALUATION  Rica Badillo, 1955, 2101/2101-A, 6/23/2022    History  [place]:  There were no encounter diagnoses. Patient  has a past medical history of Asthma, Hypertension, and Obesity. Patient  has a past surgical history that includes Total hip arthroplasty. Subjective:  Patient states: 'What with all this going on, I haven't been able to swim this summer\". Pain:  0/10. Communication with other providers:  Handoff to RN, co-eval with PT Isidoro Cockayne  Restrictions: General Precautions, Fall Risk    Home Setup/Prior level of function  Social/Functional History  Lives With: Significant other  Type of Home: House  Home Layout: One level  Home Access: Level entry  Bathroom Shower/Tub: Walk-in shower  Bathroom Toilet: Standard  Bathroom Accessibility: Accessible  Has the patient had two or more falls in the past year or any fall with injury in the past year?: Yes (1; slipped on rug, fx vertabrae last summer)  ADL Assistance: Independent  Homemaking Assistance: Independent  Homemaking Responsibilities: Yes  Ambulation Assistance: Independent  Transfer Assistance: Independent  Active : Yes  Leisure & Hobbies: Swimming    Examination of body systems (includes body structures/functions, activity/participation limitations):  · Observation:  Supine in bed upon arrival, agreeable to therapy   · Vision:  Glasses/contacts   · [facility]  · Cardiopulmonary:  On room air, tele, vitals remained stable throughout session       Body Systems and functions:  · ROM R/L:  WFL.     · Strength R/L:  BUE 5/5,   4/5  · Sensation: WFL  · Tone: Normal  · Coordination: WFL  · Perception: WNL    Activities of Daily Living (ADLs):  · Feeding: Independent   · Grooming: Independent (pt washed face w/ wet cloth sitting EOB and brushed hair sitting in reclining chair)   · UB bathing: Independent   · LB bathing: Independent    · UB dressing: Independent · LB dressing: Independent   · Toileting: Independent     Pt ADL function inferred from observation of gross motor function, and assessment of mobility, balance, posture, safety awareness, and activity tolerance. Cognitive and Psychosocial Functioning:  · Overall cognitive status:  Pt is A&Ox4, attention appears intact, and is able to follow multi-step commands w/o difficulty. · Affect: Pleasant        Mobility:  · Supine to sit: Independent   · Sit to stand: Independent (from EOB)   · Stand to sit: Independent (to reclining chair)   · Sit to supine: DNT  · Sitting balance:  Good. · Functional Mobility: Independent (pt completed approx 400ft functional mobility)   · Toilet Transfers: DNT      Pt educated on role of therapy and POC       AM-PAC Daily Activity Inpatient   How much help for putting on and taking off regular lower body clothing?: None  How much help for Bathing?: None  How much help for Toileting?: None  How much help for putting on and taking off regular upper body clothing?: None  How much help for taking care of personal grooming?: None  How much help for eating meals?: None  AM-PAC Inpatient Daily Activity Raw Score: 24  AM-PAC Inpatient ADL T-Scale Score : 57.54  ADL Inpatient CMS 0-100% Score: 0  ADL Inpatient CMS G-Code Modifier : CH    Safety: patient left in chair with chair alarm, call light within reach, RN notified, gait belt used. Assessment:  Pt is a 75 y/o female admitted from home for severe aortic stenosis. Pt at baseline is Independent for ADLs, for high level IADLs and for functional transfers/mobility. Pt would not benefit from continued acute care OT services and is safe to discharge home once medically appropriate   Complexity: Low  Prognosis: Good, no significant barriers to participation at this time.    Plan  Times per Week: eval and d/c     Equipment: defer      Recommendations for NURSING activity: Up to chair for all 3 meals and up to bathroom for all toileting needs    Time:   Time in: 0848  Time out: 0910  Timed treatment minutes: 0  Total time: 22    Electronically signed by:    William Allen S/RUSSELL   6/23/2022, 12:11 PM

## 2022-06-23 NOTE — CONSULTS
[address] [facility], 1955, 2101/2101-A, 6/23/2022    History  [place]:  There were no encounter diagnoses. Patient  has a past medical history of Asthma, Hypertension, and Obesity. Patient  has a past surgical history that includes Total hip arthroplasty. Subjective:  Patient states:  \"Sure! I'm ready to go\", \"I missed out on a a lot of my water aerobics this summer\". Pain:  Pt reports no pain. Mild soreness only at B groin sites. Communication with other providers:  Handoff to RN, co-eval with OT Student Jessica/OT Celia Cooper. Discussed pt with RN Wilburn Severin. Restrictions: Tele, Sp02, general precautions. Home Setup/Prior level of function  Social/Functional History  Lives With: Significant other  Type of Home: House  Home Layout: One level  Home Access: Level entry  Bathroom Shower/Tub: Walk-in shower  Bathroom Toilet: Standard  Bathroom Accessibility: Accessible  Has the patient had two or more falls in the past year or any fall with injury in the past year?: Yes (1; slipped on rug, fx vertabrae last summer)  ADL Assistance: [address] [place] [address]: Independent  Homemaking Responsibilities: Yes  Ambulation Assistance: Independent  Transfer Assistance: Independent  Active : Yes  Leisure & Hobbies: Swimming    Examination of body systems (includes body structures/functions, activity/participation limitations):  · Observation:  Pt is resting in semi fowlers upon arrival, vitals stable. · Vision:  [facility]  · Hearing:  [facility]  · Cardiopulmonary: on Room air, stable vitals throughout. No dizziness c/o. · Cognition: WFL, pt is A&O x4, pleasant, see OT/SLP note for further evaluation. Musculoskeletal  · ROM R/L:  WFL BLE. · Strength R/L:  Generally 4+/5 with B hip flexors 4/5, fair in function and endurance.     · Neuro:  Intact    · Gait pattern: Pt demonstrates step-through pattern with fair foot josé manuel and clearance, increased L trunk lean in L stance d/t leg length discrepancy, no evidence of LOB. Mobility:  · Supine to sit:  SBA  · Transfers: SBA  · Sitting balance: Mod I.    · Standing balance:  SUP. · Gait: SUP    [facility] 6 Clicks Inpatient Mobility:  AM-PAC Inpatient Mobility Raw Score : 21    Treatment:    Bed mobility: PT encourages sup>sit, provides v/c for sequencing. Pt demonstrates adequate ability to advance LE, requires SBA for LE/ hips to EOB and SBA only for trunk to upright. PT v/c for scooting to EOB, pt requires min increased time and effort d/t unstable mattress surface, SBA with UE support. Sitting balance: Seated EOB pt demonstrates fair balance, intermittent UE support required for maintaining upright, Mod I. Pt denies dizziness upon change in position. Pt demonstrates ability to reach/WS with minimal UE support, Mod I.     Sit<>Stand: Pt performed STS from EOB to upright with SBA, v/c for proper sequencing. Pt demonstrates efficient time to upright, min UE support for push-off. Return to seated at recliner end of session pt demonstrates good control, v/c for safe sequencing, SUP only. Standing balance: Pt with fair-good standing balance, no need for UE support, no LOB, SBA for safety only. Gait: Pt AMB x400 ft with SBA progressing to SUP, step-through pattern with fair foot josé amnuel and clearance, increased L trunk lean in L stance d/t leg length discrepancy, no evidence of LOB. Education: PT discussed pt h/o leg length discrepancy, educated on heel/shoe lifts for comfort and gait correction, discussed cardiac rehab. End of session pt left in recliner with LE elevated  with lines managed, call light, phone, exit alarm, tray, all needs, RN aware. Assessment:    Pt is a 75 y/o female admitted 6/22 for elective TAVR  6/22. Patient with significant h/o asthma, HTN, obesity, see chart. Per pt report pt has been performing ADLs/IADLs Mod I, active with exercise, see above.  At this time pt appears to be functioning slightly below baseline. Pt is now presenting with impairments in LE strength, functional endurance, gait, and cardiorespiratory endurance. Pt would benefit from skilled PT services in order to address impairments and promote return to PLOF. PT to recommend d/c to Home with assist prn, and Cardiac Rehab services. Complexity: Moderate  Prognosis: Good, no significant barriers to participation at this time.    Plan Plan: Discharge with evaluation only/week, 1 week,   Discharge Recommendations: Home with assist PRN,Outpatient PT (Cardiac Rehab OP)      Recommendations for NURSING mobility: AMB in room and in olmos with gait belt and SBA    Time:   Time in: 08:51  Time out: 09:11  Timed treatment minutes: 10  Total time: 20    Electronically signed by:    Juan A Zuleta, PT  6/56/2823, 13:71 AM  PT Lic #: [id number]

## 2022-06-23 NOTE — PLAN OF CARE
Problem: Discharge Planning  Goal: Discharge to home or other facility with appropriate resources  6/23/2022 1111 by Jaclyn Gross. Preston Archibald RN  Outcome: Completed  Flowsheets (Taken 6/23/2022 0721)  Discharge to home or other facility with appropriate resources: Refer to discharge planning if patient needs post-hospital services based on physician order or complex needs related to functional status, cognitive ability or social support system  6/23/2022 0834 by Jaclyn Gross. Preston Archibald RN  Outcome: Progressing     Problem: Skin/Tissue Integrity  Goal: Absence of new skin breakdown  Description: 1. Monitor for areas of redness and/or skin breakdown  2. Assess vascular access sites hourly  3. Every 4-6 hours minimum:  Change oxygen saturation probe site  4. Every 4-6 hours:  If on nasal continuous positive airway pressure, respiratory therapy assess nares and determine need for appliance change or resting period. 6/23/2022 1111 by Jaclyn Archibald RN  Outcome: Completed  6/23/2022 0834 by Jaclyn Archibald RN  Outcome: Progressing     Problem: ABCDS Injury Assessment  Goal: Absence of physical injury  Outcome: Completed

## 2022-06-23 NOTE — PROGRESS NOTES
Outpatient Pharmacy Progress Note for Meds-to-Beds    Total number of Prescriptions Filled: 2  The following medications were dispensed to the patient during the discharge process:  Amlodipine 5mg  Eliquis 5mg    Additional Documentation:  Patient picked-up the medication(s) in the [address] [employer] coupon card was applied to provide patient with a $0 co-pay for Eliquis      Thank you for letting us serve your patients.   [address] [facility]    [address] [facility]    Phone: [phone]    Fax: [phone]

## 2022-06-23 NOTE — PROGRESS NOTES
Dr. Cristina Mendenhall at bedside and okay to discharge. Reviewed discharge instructions and follow up appointment and medication changes. Educated on puntures sites in each groin. Site are clean, dry and soft. IV x2 removed with dressing applied.

## 2022-06-23 NOTE — CARE COORDINATION
CM into see pt to initiate a safe discharge plan. Cm introduced self and explained role of CM. Pt is kind, alert and oriented. Pt lives in one floor home. Pt lives with s/o Marko Son. Marko Patelnarcisosapna will be primary caregiver. Pt has a PCP. Pt has insurance and able to obtain prescriptions. Pt has transportation. Pt is independent for all her ADL's. Pt uses no DME. Pt declined any needs or concerns when she is discharged. CM provided card and encouraged to call for any needs or concern. CM is available if any needs arise.  [address]

## 2022-06-24 ENCOUNTER — HOSPITAL ENCOUNTER (INPATIENT)
Age: 67
LOS: 1 days | Discharge: HOME | DRG: 301 | End: 2022-06-25
Attending: EMERGENCY MEDICINE | Admitting: INTERNAL MEDICINE
Payer: MEDICARE

## 2022-06-24 ENCOUNTER — APPOINTMENT (OUTPATIENT)
Dept: CT IMAGING | Age: 67
DRG: 301 | End: 2022-06-24
Payer: MEDICARE

## 2022-06-24 ENCOUNTER — APPOINTMENT (OUTPATIENT)
Dept: ULTRASOUND IMAGING | Age: 67
DRG: 301 | End: 2022-06-24
Payer: MEDICARE

## 2022-06-24 ENCOUNTER — APPOINTMENT (OUTPATIENT)
Dept: GENERAL RADIOLOGY | Age: 67
DRG: 301 | End: 2022-06-24
Payer: MEDICARE

## 2022-06-24 DIAGNOSIS — I72.9 PSEUDOANEURYSM FOLLOWING PROCEDURE (HCC): Primary | ICD-10-CM

## 2022-06-24 DIAGNOSIS — T81.718A PSEUDOANEURYSM FOLLOWING PROCEDURE (HCC): Primary | ICD-10-CM

## 2022-06-24 LAB
ALBUMIN SERPL-MCNC: 3.4 GM/DL (ref 3.4–5)
ALP BLD-CCNC: 83 IU/L (ref 40–129)
ALT SERPL-CCNC: 21 U/L (ref 10–40)
ANION GAP SERPL CALCULATED.3IONS-SCNC: 8 MMOL/L (ref 4–16)
ANION GAP SERPL CALCULATED.3IONS-SCNC: 8 MMOL/L (ref 4–16)
APTT: 22.3 SECONDS (ref 25.1–37.1)
AST SERPL-CCNC: 24 IU/L (ref 15–37)
BASOPHILS ABSOLUTE: 0.1 K/CU MM
BASOPHILS ABSOLUTE: 0.1 K/CU MM
BASOPHILS RELATIVE PERCENT: 0.8 % (ref 0–1)
BASOPHILS RELATIVE PERCENT: 1 % (ref 0–1)
BILIRUB SERPL-MCNC: 0.6 MG/DL (ref 0–1)
BUN BLDV-MCNC: 11 MG/DL (ref 6–23)
BUN BLDV-MCNC: 11 MG/DL (ref 6–23)
CALCIUM SERPL-MCNC: 8.7 MG/DL (ref 8.3–10.6)
CALCIUM SERPL-MCNC: 8.7 MG/DL (ref 8.3–10.6)
CHLORIDE BLD-SCNC: 102 MMOL/L (ref 99–110)
CHLORIDE BLD-SCNC: 102 MMOL/L (ref 99–110)
CO2: 24 MMOL/L (ref 21–32)
CO2: 25 MMOL/L (ref 21–32)
CREAT SERPL-MCNC: 0.3 MG/DL (ref 0.6–1.1)
CREAT SERPL-MCNC: 0.4 MG/DL (ref 0.6–1.1)
DIFFERENTIAL TYPE: ABNORMAL
DIFFERENTIAL TYPE: ABNORMAL
EOSINOPHILS ABSOLUTE: 0.1 K/CU MM
EOSINOPHILS ABSOLUTE: 0.2 K/CU MM
EOSINOPHILS RELATIVE PERCENT: 0.6 % (ref 0–3)
EOSINOPHILS RELATIVE PERCENT: 1.8 % (ref 0–3)
GFR AFRICAN AMERICAN: >60 ML/MIN/1.73M2
GFR AFRICAN AMERICAN: >60 ML/MIN/1.73M2
GFR NON-AFRICAN AMERICAN: >60 ML/MIN/1.73M2
GFR NON-AFRICAN AMERICAN: >60 ML/MIN/1.73M2
GLUCOSE BLD-MCNC: 178 MG/DL (ref 70–99)
GLUCOSE BLD-MCNC: 206 MG/DL (ref 70–99)
HCT VFR BLD CALC: 29.4 % (ref 37–47)
HCT VFR BLD CALC: 31.9 % (ref 37–47)
HEMOGLOBIN: 10.6 GM/DL (ref 12.5–16)
HEMOGLOBIN: 9.9 GM/DL (ref 12.5–16)
IMMATURE NEUTROPHIL %: 0.7 % (ref 0–0.43)
IMMATURE NEUTROPHIL %: 0.8 % (ref 0–0.43)
INR BLD: 1 INDEX
LYMPHOCYTES ABSOLUTE: 0.9 K/CU MM
LYMPHOCYTES ABSOLUTE: 2.2 K/CU MM
LYMPHOCYTES RELATIVE PERCENT: 10 % (ref 24–44)
LYMPHOCYTES RELATIVE PERCENT: 24.1 % (ref 24–44)
MCH RBC QN AUTO: 28.9 PG (ref 27–31)
MCH RBC QN AUTO: 29.6 PG (ref 27–31)
MCHC RBC AUTO-ENTMCNC: 33.2 % (ref 32–36)
MCHC RBC AUTO-ENTMCNC: 33.7 % (ref 32–36)
MCV RBC AUTO: 86.9 FL (ref 78–100)
MCV RBC AUTO: 87.8 FL (ref 78–100)
MONOCYTES ABSOLUTE: 0.8 K/CU MM
MONOCYTES ABSOLUTE: 1.2 K/CU MM
MONOCYTES RELATIVE PERCENT: 13.4 % (ref 0–4)
MONOCYTES RELATIVE PERCENT: 8.9 % (ref 0–4)
NUCLEATED RBC %: 0 %
NUCLEATED RBC %: 0 %
PDW BLD-RTO: 14.2 % (ref 11.7–14.9)
PDW BLD-RTO: 14.6 % (ref 11.7–14.9)
PLATELET # BLD: 108 K/CU MM (ref 140–440)
PLATELET # BLD: 131 K/CU MM (ref 140–440)
PMV BLD AUTO: 11.5 FL (ref 7.5–11.1)
PMV BLD AUTO: 11.7 FL (ref 7.5–11.1)
POTASSIUM SERPL-SCNC: 4.5 MMOL/L (ref 3.5–5.1)
POTASSIUM SERPL-SCNC: 4.7 MMOL/L (ref 3.5–5.1)
PROTHROMBIN TIME: 12.9 SECONDS (ref 11.7–14.5)
RBC # BLD: 3.35 M/CU MM (ref 4.2–5.4)
RBC # BLD: 3.67 M/CU MM (ref 4.2–5.4)
SEGMENTED NEUTROPHILS ABSOLUTE COUNT: 5.3 K/CU MM
SEGMENTED NEUTROPHILS ABSOLUTE COUNT: 6.9 K/CU MM
SEGMENTED NEUTROPHILS RELATIVE PERCENT: 58.9 % (ref 36–66)
SEGMENTED NEUTROPHILS RELATIVE PERCENT: 79 % (ref 36–66)
SODIUM BLD-SCNC: 134 MMOL/L (ref 135–145)
SODIUM BLD-SCNC: 135 MMOL/L (ref 135–145)
TOTAL IMMATURE NEUTOROPHIL: 0.06 K/CU MM
TOTAL IMMATURE NEUTOROPHIL: 0.07 K/CU MM
TOTAL NUCLEATED RBC: 0 K/CU MM
TOTAL NUCLEATED RBC: 0 K/CU MM
TOTAL PROTEIN: 6.3 GM/DL (ref 6.4–8.2)
WBC # BLD: 8.7 K/CU MM (ref 4–10.5)
WBC # BLD: 8.9 K/CU MM (ref 4–10.5)

## 2022-06-24 PROCEDURE — 80053 COMPREHEN METABOLIC PANEL: CPT

## 2022-06-24 PROCEDURE — 80048 BASIC METABOLIC PNL TOTAL CA: CPT

## 2022-06-24 PROCEDURE — 86850 RBC ANTIBODY SCREEN: CPT

## 2022-06-24 PROCEDURE — 86900 BLOOD TYPING SEROLOGIC ABO: CPT

## 2022-06-24 PROCEDURE — 71045 X-RAY EXAM CHEST 1 VIEW: CPT

## 2022-06-24 PROCEDURE — 6370000000 HC RX 637 (ALT 250 FOR IP): Performed by: INTERNAL MEDICINE

## 2022-06-24 PROCEDURE — 93005 ELECTROCARDIOGRAM TRACING: CPT

## 2022-06-24 PROCEDURE — 93926 LOWER EXTREMITY STUDY: CPT

## 2022-06-24 PROCEDURE — 86901 BLOOD TYPING SEROLOGIC RH(D): CPT

## 2022-06-24 PROCEDURE — 86922 COMPATIBILITY TEST ANTIGLOB: CPT

## 2022-06-24 PROCEDURE — 6360000004 HC RX CONTRAST MEDICATION: Performed by: INTERNAL MEDICINE

## 2022-06-24 PROCEDURE — 99223 1ST HOSP IP/OBS HIGH 75: CPT | Performed by: INTERNAL MEDICINE

## 2022-06-24 PROCEDURE — 99285 EMERGENCY DEPT VISIT HI MDM: CPT

## 2022-06-24 PROCEDURE — 85730 THROMBOPLASTIN TIME PARTIAL: CPT

## 2022-06-24 PROCEDURE — 1200000000 HC SEMI PRIVATE

## 2022-06-24 PROCEDURE — 36415 COLL VENOUS BLD VENIPUNCTURE: CPT

## 2022-06-24 PROCEDURE — 85025 COMPLETE CBC W/AUTO DIFF WBC: CPT

## 2022-06-24 PROCEDURE — 73706 CT ANGIO LWR EXTR W/O&W/DYE: CPT

## 2022-06-24 PROCEDURE — 6360000002 HC RX W HCPCS: Performed by: INTERNAL MEDICINE

## 2022-06-24 PROCEDURE — 85610 PROTHROMBIN TIME: CPT

## 2022-06-24 PROCEDURE — 2580000003 HC RX 258

## 2022-06-24 RX ORDER — LISINOPRIL 5 MG/1
2.5 TABLET ORAL DAILY
Status: DISCONTINUED | OUTPATIENT
Start: 2022-06-24 | End: 2022-06-25 | Stop reason: HOSPADM

## 2022-06-24 RX ORDER — ALBUTEROL SULFATE 90 UG/1
2 AEROSOL, METERED RESPIRATORY (INHALATION) EVERY 6 HOURS PRN
Status: DISCONTINUED | OUTPATIENT
Start: 2022-06-24 | End: 2022-06-25 | Stop reason: HOSPADM

## 2022-06-24 RX ORDER — MORPHINE SULFATE 2 MG/ML
2 INJECTION, SOLUTION INTRAMUSCULAR; INTRAVENOUS EVERY 4 HOURS PRN
Status: DISCONTINUED | OUTPATIENT
Start: 2022-06-24 | End: 2022-06-25 | Stop reason: HOSPADM

## 2022-06-24 RX ORDER — URSODIOL 300 MG/1
300 CAPSULE ORAL 4 TIMES DAILY
Status: DISCONTINUED | OUTPATIENT
Start: 2022-06-24 | End: 2022-06-25 | Stop reason: HOSPADM

## 2022-06-24 RX ORDER — SODIUM CHLORIDE, SODIUM LACTATE, POTASSIUM CHLORIDE, CALCIUM CHLORIDE 600; 310; 30; 20 MG/100ML; MG/100ML; MG/100ML; MG/100ML
INJECTION, SOLUTION INTRAVENOUS CONTINUOUS
Status: DISCONTINUED | OUTPATIENT
Start: 2022-06-24 | End: 2022-06-25 | Stop reason: HOSPADM

## 2022-06-24 RX ORDER — AMLODIPINE BESYLATE 5 MG/1
5 TABLET ORAL DAILY
Status: DISCONTINUED | OUTPATIENT
Start: 2022-06-24 | End: 2022-06-25 | Stop reason: HOSPADM

## 2022-06-24 RX ORDER — METOPROLOL SUCCINATE 25 MG/1
25 TABLET, EXTENDED RELEASE ORAL DAILY
Status: DISCONTINUED | OUTPATIENT
Start: 2022-06-24 | End: 2022-06-25 | Stop reason: HOSPADM

## 2022-06-24 RX ORDER — ASPIRIN 81 MG/1
81 TABLET ORAL DAILY
Status: DISCONTINUED | OUTPATIENT
Start: 2022-06-24 | End: 2022-06-25 | Stop reason: HOSPADM

## 2022-06-24 RX ORDER — ENOXAPARIN SODIUM 100 MG/ML
30 INJECTION SUBCUTANEOUS DAILY
Status: DISCONTINUED | OUTPATIENT
Start: 2022-06-24 | End: 2022-06-25 | Stop reason: HOSPADM

## 2022-06-24 RX ORDER — HYDROCODONE BITARTRATE AND ACETAMINOPHEN 5; 325 MG/1; MG/1
1 TABLET ORAL EVERY 6 HOURS PRN
Status: DISCONTINUED | OUTPATIENT
Start: 2022-06-24 | End: 2022-06-25 | Stop reason: HOSPADM

## 2022-06-24 RX ADMIN — HYDROCODONE BITARTRATE AND ACETAMINOPHEN 1 TABLET: 5; 325 TABLET ORAL at 16:36

## 2022-06-24 RX ADMIN — IOPAMIDOL 75 ML: 755 INJECTION, SOLUTION INTRAVENOUS at 19:22

## 2022-06-24 RX ADMIN — ASPIRIN 81 MG: 81 TABLET, COATED ORAL at 18:45

## 2022-06-24 RX ADMIN — ENOXAPARIN SODIUM 30 MG: 100 INJECTION SUBCUTANEOUS at 18:45

## 2022-06-24 RX ADMIN — URSODIOL 300 MG: 300 CAPSULE ORAL at 22:17

## 2022-06-24 RX ADMIN — SODIUM CHLORIDE, POTASSIUM CHLORIDE, SODIUM LACTATE AND CALCIUM CHLORIDE: 600; 310; 30; 20 INJECTION, SOLUTION INTRAVENOUS at 22:23

## 2022-06-24 ASSESSMENT — ENCOUNTER SYMPTOMS
NAUSEA: 1
ABDOMINAL PAIN: 0
DIARRHEA: 0
CHEST TIGHTNESS: 0
VOMITING: 0
SHORTNESS OF BREATH: 0

## 2022-06-24 ASSESSMENT — PAIN SCALES - GENERAL
PAINLEVEL_OUTOF10: 0
PAINLEVEL_OUTOF10: 7
PAINLEVEL_OUTOF10: 8

## 2022-06-24 ASSESSMENT — PAIN - FUNCTIONAL ASSESSMENT: PAIN_FUNCTIONAL_ASSESSMENT: 0-10

## 2022-06-24 NOTE — ED PROVIDER NOTES
Triage Chief Complaint:   Groin Pain (recent procedure, large amount of bruising to left side)    Council:  Diane Patino is a 74 y.o. female that presents with groin swelling and bruising. Patient is actually a transfer from emergency department from [facility] in [facility] with concern for possible pseudoaneurysm or post TAVR complication. Patient reports she was actually recovering very well after her TAVR procedure 2 days ago. Patient reports \"my heart feels fine and I am breathing okay\". Patient reports that this morning while she was having something to eat and drink she felt a sudden ache and pulling pain to the left groin. Patient reports that she looked down and \"a third of the leg was bruised\". Patient was evaluated at outside hospital emergency department where some ice packs were applied to her groin and she was observed pending transfer here. Patient reports that pain improved with the ice packs and she has not noticed any more recent rapid expansion. Pain is currently mild, aching, constant localized to left groin. Patient was previously on anticoagulation but that has been held until this weekend. No current anticoagulation use.     ROS:  General:  No fevers, no chills  ENT: No sore throat, no runny nose  Cardiovascular:  No chest pain, no palpitations  Respiratory:  No shortness of breath, no cough  Gastrointestinal:  No pain, no nausea, no vomiting, no diarrhea  : No pain with urinating, no increased frequency urinating  Neurologic:  No numbness, no weakness  Extremities:  + edema, + pain  Skin:  No rash, + bruising  Psych: No axienty    Past Medical History:   Diagnosis Date    Asthma     Hypertension     Obesity      Past Surgical History:   Procedure Laterality Date    TOTAL HIP ARTHROPLASTY       Family History   Problem Relation Age of Onset    Other Mother         lymphoma    Stroke Mother     High Blood Pressure Mother     High Cholesterol Mother     Prostate Cancer Father     Colon Cancer Father     Other Father         Melanoma skin cancer    Diabetes Father     High Cholesterol Father      Social History     Socioeconomic History    Marital status:      Spouse name: Not on file    Number of children: Not on file    Years of education: Not on file    Highest education level: Not on file   Occupational History    Not on file   Tobacco Use    Smoking status: Never Smoker    Smokeless tobacco: Never Used   Substance and Sexual Activity    Alcohol use: Not Currently    Drug use: Never    Sexual activity: Not on file   Other Topics Concern    Not on file   Social History Narrative    Not on file     Social Determinants of Health     Financial Resource Strain:     Difficulty of Paying Living Expenses: Not on file   Food Insecurity:     Worried About Running Out of Food in the Last Year: Not on file    Ella of Food in the Last Year: Not on file   Transportation Needs:     Lack of Transportation (Medical): Not on file    Lack of Transportation (Non-Medical):  Not on file   Physical Activity:     Days of Exercise per Week: Not on file    Minutes of Exercise per Session: Not on file   Stress:     Feeling of Stress : Not on file   Social Connections:     Frequency of Communication with Friends and Family: Not on file    Frequency of Social Gatherings with Friends and Family: Not on file    Attends [employer] Services: Not on file    Active Member of [address] [place] [address] [employer] or Organizations: Not on file    Attends Club or Organization Meetings: Not on file    Marital Status: Not on file   Intimate Partner Violence:     Fear of Current or Ex-Partner: Not on file    Emotionally Abused: Not on file    Physically Abused: Not on file    Sexually Abused: Not on file   Housing Stability:     Unable to Pay for Housing in the Last Year: Not on file    Number of [place] in the Last Year: Not on file    Unstable Housing in the Last Year: Not on file     Current Facility-Administered Medications   Medication Dose Route Frequency Provider Last Rate Last Admin    aspirin EC tablet 81 mg  81 mg Oral Daily Dre Hutton MD   81 mg at 06/24/22 1845    albuterol sulfate HFA (PROVENTIL;VENTOLIN;PROAIR) 108 (90 Base) MCG/ACT inhaler 2 puff  2 puff Inhalation Q6H PRN Dre Hutton MD        amLODIPine (NORVASC) tablet 5 mg  5 mg Oral Daily Dre Hutton MD        lisinopril (PRINIVIL;ZESTRIL) tablet 2.5 mg  2.5 mg Oral Daily Dre Hutton MD        metoprolol succinate (TOPROL XL) extended release tablet 25 mg  25 mg Oral Daily Dre Hutton MD        ursodiol (ACTIGALL) capsule 300 mg  300 mg Oral 4x Daily Dre Hutton MD   300 mg at 06/24/22 2217    HYDROcodone-acetaminophen (NORCO) 5-325 MG per tablet 1 tablet  1 tablet Oral Q6H PRN Dre Hutton MD   1 tablet at 06/24/22 1636    morphine (PF) injection 2 mg  2 mg IntraVENous Q4H PRN MD Mariusz Guido [facility] [place] by provider] enoxaparin Sodium (LOVENOX) injection 30 mg  30 mg SubCUTAneous Daily Dre Hutton MD   30 mg at 06/24/22 1845    lactated ringers infusion   IntraVENous Continuous Rj , PA-C 60 mL/hr at 06/24/22 2223 New Bag at 06/24/22 2223     Allergies   Allergen Reactions    Prednisone      Has a sensitivity  Other reaction(s): Joint Pain       Nursing Notes Reviewed    Physical Exam:  ED Triage Vitals [06/24/22 1449]   Enc Vitals Group      BP (!) 137/59      Heart Rate 77      Resp 15      Temp 98.1 °F (36.7 °C)      Temp Source Oral      SpO2 100 %      Weight 201 lb (91.2 kg)      Height 5' 2\" (1.575 m)      Head Circumference       Peak Flow       Pain Score       Pain Loc       Pain Edu? Excl. in [facility]? My pulse ox interpretation is - normal    General appearance:  No acute distress. Sitting comfortably in bed. Skin:  Warm. Dry.   There is contusion/ecchymosis to the anterior medial and lateral proximal thigh on the left into the mons pubis. This area is point tender to palpation. There is no palpable thrill at this time. Eye:  Extraocular movements intact. Ears, nose, mouth and throat:  Oral mucosa moist   Extremity:  No swelling of the left groin/thigh. Normal ROM. Skin findings as above. Heart:  Regular rate and rhythm, normal S1 & S2, no extra heart sounds. Abdomen:  Soft. Nontender. Nondistended. No rebound or guarding. Respiratory:  Lungs clear to auscultation bilaterally. Respirations nonlabored. Speaking clearly in full sentences. No respiratory distress. Neurological:  Alert and oriented times 3. No focal neuro deficits.                 I have reviewed and interpreted all of the currently available lab results from this visit (if applicable):  Results for orders placed or performed during the hospital encounter of 06/24/22   CBC with Auto Differential   Result Value Ref Range    WBC 8.7 4.0 - 10.5 K/CU MM    RBC 3.67 (L) 4.2 - 5.4 M/CU MM    Hemoglobin 10.6 (L) 12.5 - 16.0 GM/DL    Hematocrit 31.9 (L) 37 - 47 %    MCV 86.9 78 - 100 FL    MCH 28.9 27 - 31 PG    MCHC 33.2 32.0 - 36.0 %    RDW 14.2 11.7 - 14.9 %    Platelets 267 (L) 957 - 440 K/CU MM    MPV 11.5 (H) 7.5 - 11.1 FL    Differential Type AUTOMATED DIFFERENTIAL     Segs Relative 79.0 (H) 36 - 66 %    Lymphocytes % 10.0 (L) 24 - 44 %    Monocytes % 8.9 (H) 0 - 4 %    Eosinophils % 0.6 0 - 3 %    Basophils % 0.8 0 - 1 %    Segs Absolute 6.9 K/CU MM    Lymphocytes Absolute 0.9 K/CU MM    Monocytes Absolute 0.8 K/CU MM    Eosinophils Absolute 0.1 K/CU MM    Basophils Absolute 0.1 K/CU MM    Nucleated RBC % 0.0 %    Total Nucleated RBC 0.0 K/CU MM    Total Immature Neutrophil 0.06 K/CU MM    Immature Neutrophil % 0.7 (H) 0 - 0.43 %   Comprehensive Metabolic Panel   Result Value Ref Range    Sodium 134 (L) 135 - 145 MMOL/L    Potassium 4.5 3.5 - 5.1 MMOL/L    Chloride 102 99 - 110 mMol/L    CO2 24 21 - 32 MMOL/L    BUN 11 6 - 23 MG/DL    CREATININE 0.3 (L) 0.6 - 1.1 MG/DL    Glucose 206 (H) 70 - 99 MG/DL    Calcium 8.7 8.3 - 10.6 MG/DL    Albumin 3.4 3.4 - 5.0 GM/DL    Total Protein 6.3 (L) 6.4 - 8.2 GM/DL    Total Bilirubin 0.6 0.0 - 1.0 MG/DL    ALT 21 10 - 40 U/L    AST 24 15 - 37 IU/L    Alkaline Phosphatase 83 40 - 129 IU/L    GFR Non-African American >60 >60 mL/min/1.73m2    GFR African American >60 >60 mL/min/1.73m2    Anion Gap 8 4 - 16   Protime/INR & PTT   Result Value Ref Range    Protime 12.9 11.7 - 14.5 SECONDS    INR 1.00 INDEX    aPTT 22.3 (L) 25.1 - 37.1 SECONDS   TYPE AND SCREEN   Result Value Ref Range    ABO/Rh O POSITIVE     Antibody Screen NEGATIVE       Radiographs (if obtained):  [] The following radiograph was interpreted by myself in the absence of a radiologist:   [x] Radiologist's Report Reviewed:  CTA LOWER EXTREMITY LEFT W CONTRAST   Final Result   1. Evidence of two pseudoaneurysms adjacent to each other in the left groin   coming off the common femoral artery. The inflow including the aorta and   iliac circulation as well as outflow including SFA, profunda femoris   popliteal and trifurcation vessels are all patent. Findings discussed with DR. Vaughn Clark on 06/24/2022 at 8 24 p.m. VL DUP LOWER EXTREMITY ARTERIES LEFT   Final Result   Left groin femoral pseudoaneurysms which are fairly small and should be able   to be compressed and eliminated. Flow in the femoral circulation remains   adequate.          XR CHEST PORTABLE    (Results Pending)         EKG (if obtained): (All EKG's are interpreted by myself in the absence of a cardiologist)    Chart review shows recent radiographs:  Echocardiogram complete 2D with doppler with color    Result Date: 6/23/2022  Transthoracic Echocardiography Report (TTE)  Demographics   Patient Name       Pamela Abernathy      Date of Study       06/23/2022   Date of Birth      1955         Gender              Female   Age                74 year(s)         Race                 Patient Number     [id number]         Room Number         2101   Visit Number       [id number]   Corporate ID       [id number]   Accession Number   [id number]         Preeti Bruno RVT   Ordering Physician Rakesh Damian MD        Physician           MD  Procedure Type of Study   TTE procedure:ECHOCARDIOGRAM COMPLETE 2D W DOPPLER W COLOR. Procedure Date Date: 2022 Start: 10:48 AM Study Location: Portable Technical Quality: Adequate visualization Indications:S/P TAVR. Patient Status: Routine Height: 63 inches Weight: 197 pounds BSA: 1.92 m2 BMI: 34.9 kg/m2 HR: 74 bpm BP: 170/54 mmHg  Conclusions   Summary  Left ventricular systolic function is normal.  Ejection fraction is visually estimated at 55-60%. Moderate left ventricular hypertrophy. Grade I diastolic dysfunction. S/p TAVR () with a 26 mm Medtronic Evolut PRO aortic valve; max PG:  15 mmHg, Mean P mmHg, ALVA: 3.64, DVI: 0.9. Trace perivalvular leak noted at the 6 o'clock position. No evidence of any pericardial effusion. Signature   ------------------------------------------------------------------  Electronically signed by Sharon Doyle MD (Interpreting  physician) on 2022 at 04:25 PM  ------------------------------------------------------------------   Findings   Left Ventricle  Left ventricular systolic function is normal.  Ejection fraction is visually estimated at 55-60%. Moderate left ventricular hypertrophy. Grade I diastolic dysfunction. Left ventricle size is normal.   Left Atrium  Mildly dilated left atrium. Right Atrium  Essentially normal right atrium. Right Ventricle  Essentially normal right ventricle. Aortic Valve  S/p TAVR () with a 26 mm Medtronic Evolut PRO aortic valve; max PG:  15 mmHg, Mean P mmHg, ALVA: 3.64, DVI: 0.9.   Trace perivalvular leak noted at the 6 o'clock position. Mitral Valve  Structurally normal mitral valve. Tricuspid Valve  Trace tricuspid regurgitation; normal RVSP. Pulmonic Valve  Pulmonic valve is structurally normal.   Pericardial Effusion  No evidence of any pericardial effusion. Pleural Effusion  No evidence of pleural effusion. Miscellaneous  IVC and abdominal aorta are within normal limits.   M-Mode/2D Measurements & Calculations   LV Diastolic Dimension:  LV Systolic Dimension:  LA Dimension: 4 cmAO Root  3.82 cm                  2.44 cm                 Dimension: 3.1 cmLA Area:  LV FS:36.1 %             LV Volume Diastolic: 71 20 cm2  LV PW Diastolic: 0.61 cm ml  LV PW Systolic: 7.95 cm  LV Volume Systolic: 22  Septum Diastolic: 7.47   ml  cm                       LV EDV/LV EDV Index: 71 RV Diastolic Dimension:  Septum Systolic: 0.75 cm IZ/23 P0RZ ESV/LV ESV   2.62 cm  CO: 10.6 l/min           Index: 22 ml/11 m2  CI: 5.52 l/m*m2          EF Calculated (A4C): 69 LA/Aorta: 1.29                           %  LV Area Diastolic: 89.0  EF Calculated (2D):     LA volume/Index: 59 ml  cm2                      66.5 %                  /57J0  LV Area Systolic: 94.9  cm2                      LV Length: 8.7 cm                            LVOT: 2.2 cm  Doppler Measurements & Calculations   MV Peak E-Wave: 99.2 AV Peak Velocity: 196 cm/s    LVOT Peak Velocity: 168  cm/s                 AV Peak Gradient: 15.37 mmHg  cm/s  MV Peak A-Wave: 119  AV Mean Velocity: 142 cm/s    LVOT Mean Velocity: 129  cm/s                 AV Mean Gradient: 9 mmHg      cm/s  MV E/A Ratio: 0.83   AV VTI: 39.3 cm               LVOT Peak Gradient: 11  MV Peak Gradient:    AV Area (Continuity):3.64 cm2 mmHgLVOT Mean Gradient: 7  3.94 mmHg                                          mmHg                       LVOT VTI: 37.7 cm             Estimated RVSP: 27 mmHg  MV P1/2t: 66 msec                                  Estimated RAP:3 mmHg  MVA by PHT:3.33 cm2  Estimated PASP: 27.01 mmHg   MV E' Septal                                       TR Velocity:245 cm/s  Velocity: 6.69 cm/s                                TR Gradient:24.01 mmHg  MV E' Lateral  Velocity: 6.47 cm/s  MV E/E' septal:  14.83  MV E/E' lateral:  15.33      Echocardiogram complete 2D with doppler with color    Result Date: 6/22/2022  Transthoracic Echocardiography Report (TTE)  Demographics   Patient [facility]      Date of Study   Date of Birth      1955         Gender              Female   Age                74 year(s)         Race                   Patient Number     [id number]         Room Number         2101   Visit Number       [id number]   Corporate ID       [id number]   Accession Number   [id number]         Sonographer   Ordering Physician Arlen Trivedi MD        Physician           Laverne RHOADES  Procedure Type of Study   TTE procedure:ECHOCARDIOGRAM COMPLETE 2D W DOPPLER W COLOR. Study Location: Cath Lab Indications: Aortic stenosis.  Height: 63 inches Weight: 197 pounds BSA: 1.92 m2 BMI: 34.9 kg/m2  Conclusions   Summary  in epic   Signature   ------------------------------------------------------------------  Electronically signed by Chel Millan MD  (Interpreting physician) on 06/22/2022 at 11:22 AM  ------------------------------------------------------------------      Echocardiogram limited    Result Date: 6/22/2022  Transthoracic Echocardiography Report (TTE)  Demographics   Patient Name       [facility]      Date of Study       06/22/2022   Date of Birth      1955         Gender              Female   Age                74 year(s)         Race                   Patient Number     [id number]         Room Number         2101   Visit Number       [id number]   Corporate ID       [id number]   Accession Number   [id number]         Mehdi Venegas Dr radiograph was obtained. The heart size, mediastinal contour, and pleural spaces are within normal limits. The lungs are clear. There is no focal consolidation or pneumothorax. The pulmonary vascular pattern is within normal limits. No acute thoracic osseous abnormality. Clear lungs. No acute cardiopulmonary abnormality. MDM:  Pt presents as above. Emergent conditions considered. Presentation prompted initial immediate evaluation. Ultrasound is ordered as well as lab work. Cardiology is consulted. Dr. Enrrique Rodriguez actually came to the emergency department and evaluated the patient and is recommending CTA imaging in addition to the ultrasound for possible operative planning. He is planning on admitting the patient himself. CTA is pending on admission in addition to any definitive operative planning. Questions sought and answered with the patient. They voice understanding and agree with plan. Care of this patient did occur during the COVID-19 pandemic. Is this patient to be included in the SEP-1 Core Measure due to severe sepsis or septic shock? No   Exclusion criteria - the patient is NOT to be included for SEP-1 Core Measure due to: Infection is not suspected      Clinical Impression:  1. Pseudoaneurysm following procedure [facility])      Disposition referral (if applicable):  No follow-up provider specified. Disposition medications (if applicable):  Current Discharge Medication List          Comment: Please note this report has been produced using speech recognition software and may contain errors related to that system including errors in grammar, punctuation, and spelling, as well as words and phrases that may be inappropriate. If there are any questions or concerns please feel free to contact the dictating provider for clarification.          Ira Saeed MD  06/24/22 7182

## 2022-06-24 NOTE — H&P
Regi Ibarra, 74 y.o., female    Primary care physician:  Daron Johnson MD     Chief Complaint: Left groin pain     History of Present Illness: Sunny Berumen had TAVR procedure done 2 days ago on 6/22/22 for severe aortic stenosis  This morning she came in urgently after she developed severe pain in the left groin site and noticing a swelling  Initially went to the local hospital and was transferred here urgent ultrasound in the emergency department concerning for possible pseudoaneurysm which is bilobed    Past medical history:    has a past medical history of Asthma, Hypertension, and Obesity. Past surgical history:   has a past surgical history that includes Total hip arthroplasty. Social History:   reports that she has never smoked. She has never used smokeless tobacco. She reports previous alcohol use. She reports that she does not use drugs. Family history:  family history includes Colon Cancer in her father; Diabetes in her father; High Blood Pressure in her mother; High Cholesterol in her father and mother; Other in her father and mother; Prostate Cancer in her father; Stroke in her mother. Allergies: Allergies   Allergen Reactions    Prednisone      Has a sensitivity  Other reaction(s): Joint Pain       Home Medications:  Prior to Admission medications    Medication Sig Start Date End Date Taking?  Authorizing Provider   apixaban (ELIQUIS) 5 MG TABS tablet Take 1 tablet by mouth 2 times daily 6/23/22  Yes Sonali Fritz MD   amLODIPine (NORVASC) 5 MG tablet Take 1 tablet by mouth daily 6/23/22  Yes Sonali Fritz MD   metoprolol succinate (TOPROL XL) 25 MG extended release tablet Take 1 tablet by mouth daily 3/1/22  Yes Xochilt Rosenbaum MD   lisinopril (PRINIVIL;ZESTRIL) 2.5 MG tablet Take by mouth daily 1/2 tab qam and 1/2 tab qhs   Yes Historical Provider, MD   ursodiol (ACTIGALL) 300 MG capsule Take 1 capsule by mouth 4 times daily 4/16/21  Yes Historical Provider, MD   Multiple Vitamins-Minerals (MULTIVITAMIN WOMEN 50+ PO) Take 1 tablet by mouth daily   Yes Historical Provider, MD   aspirin 81 MG EC tablet Take 81 mg by mouth daily    Historical Provider, MD   Spacer/Aero-Holding Jacobo Barron 1 Device by Does not apply route daily as needed (use with albuterol rescue inhaler) 12/10/21   JAMIN Thompson CNP   albuterol sulfate HFA (PROVENTIL HFA) 108 (90 Base) MCG/ACT inhaler Inhale 2 puffs into the lungs every 6 hours as needed for Wheezing 12/10/21 3/1/22  JAMIN Davies CNP       Review of systems: Review of Systems:   · Constitutional: No Fever or Weight Loss   · Eyes: No Decreased Vision  · ENT: No Headaches, Hearing Loss or Vertigo  · Cardiovascular: No chest pain, dyspnea on exertion, palpitations or loss of consciousness  · Respiratory: No cough or wheezing    · Gastrointestinal: No abdominal pain, appetite loss, blood in stools, constipation, diarrhea or heartburn  · Genitourinary: No dysuria, trouble voiding, or hematuria  · Musculoskeletal:  No gait disturbance, weakness or joint complaints  · Integumentary: No rash or pruritis  · Neurological: No TIA or stroke symptoms  · Psychiatric: No anxiety or depression  · Endocrine: No malaise, fatigue or temperature intolerance  · Hematologic/Lymphatic: No bleeding problems, blood clots or swollen lymph nodes  Allergic/Immunologic: No nasal congestion or hives    Physical Examination:    BP (!) 137/59   Pulse 77   Temp 98.1 °F (36.7 °C) (Oral)   Resp 15   Ht 5' 2\" (1.575 m)   Wt 201 lb (91.2 kg)   SpO2 100%   BMI 36.76 kg/m²      General Appearance:  No distress, conversant  Constitutional:  Well developed, Well nourished, No acute distress, Non-toxic appearance. HENT:  Normocephalic, Atraumatic, Bilateral external ears normal, Oropharynx moist, No oral exudates, Nose normal. Neck- Normal range of motion, No tenderness, Supple, No stridor,no apical-carotid delay  Eyes:  PERRL, EOMI, Conjunctiva normal, No discharge. Lymphatics: no palpable lymph nodes  Respiratory:  Normal breath sounds, No respiratory distress, No wheezing, No chest tenderness. ,no uise of accessory muscles, JVP not elevated  Cardiovascular: (PMI) apex non displaced,no lifts no thrills, no s3,no s4, Normal heart rate, Normal rhythm, No murmurs, No rubs, No gallops. GI:  Bowel sounds normal, Soft, No tenderness, No masses, No pulsatile masses, no hepatosplenomegally, no bruits  Musculoskeletal:  Intact distal pulses, No edema, No tenderness, No cyanosis, No clubbing. Good range of motion in all major joints. No tenderness to palpation or major deformities noted. Back- No tenderness. Integument:  Warm, Dry, No erythema, No rash. Skin: no rash, no ulcers  Lymphatic:  No lymphadenopathy noted. Neurologic:  Alert & oriented x 3, Normal motor function, Normal sensory function, No focal deficits noted. Lab Review   Recent Labs     06/24/22  1503   WBC 8.7   HGB 10.6*   HCT 31.9*   *      Recent Labs     06/24/22  1503   *   K 4.5      CO2 24   BUN 11   CREATININE 0.3*     Recent Labs     06/24/22  1503   AST 24   ALT 21   BILITOT 0.6   ALKPHOS 83     No results for input(s): TROPONINI in the last 72 hours.   Lab Results   Component Value Date    INR 1.00 06/24/2022    PROTIME 12.9 06/24/2022     No results found for: BNP      Assessment:    Pseudoaneurysm: Discussed with IR recommended getting CT surgery evaluation we will get CTA left lower extremity and debate about surgical ligation of pseudoaneurysm  Admit for further management and close monitoring  Pain management  Restart home medication for blood pressure  Hold Eliquis  She also has history of chronic thrombocytopenia

## 2022-06-25 VITALS
RESPIRATION RATE: 24 BRPM | BODY MASS INDEX: 39.72 KG/M2 | DIASTOLIC BLOOD PRESSURE: 59 MMHG | HEART RATE: 79 BPM | SYSTOLIC BLOOD PRESSURE: 121 MMHG | HEIGHT: 62 IN | TEMPERATURE: 98.2 F | OXYGEN SATURATION: 98 % | WEIGHT: 215.83 LBS

## 2022-06-25 LAB
ABO/RH: NORMAL
ANTIBODY SCREEN: NEGATIVE
COMMENT: NORMAL
COMPONENT: NORMAL
COMPONENT: NORMAL
CROSSMATCH RESULT: NORMAL
CROSSMATCH RESULT: NORMAL
STATUS: NORMAL
STATUS: NORMAL
TRANSFUSION STATUS: NORMAL
TRANSFUSION STATUS: NORMAL
UNIT DIVISION: 0
UNIT DIVISION: 0
UNIT NUMBER: NORMAL
UNIT NUMBER: NORMAL

## 2022-06-25 PROCEDURE — 6370000000 HC RX 637 (ALT 250 FOR IP): Performed by: INTERNAL MEDICINE

## 2022-06-25 PROCEDURE — APPSS15 APP SPLIT SHARED TIME 0-15 MINUTES: Performed by: NURSE PRACTITIONER

## 2022-06-25 RX ORDER — LISINOPRIL 5 MG/1
2.5 TABLET ORAL ONCE
Status: COMPLETED | OUTPATIENT
Start: 2022-06-25 | End: 2022-06-25

## 2022-06-25 RX ORDER — LISINOPRIL 5 MG/1
2.5 TABLET ORAL DAILY
Status: DISCONTINUED | OUTPATIENT
Start: 2022-06-25 | End: 2022-06-25 | Stop reason: SDUPTHER

## 2022-06-25 RX ORDER — LISINOPRIL 2.5 MG/1
2.5 TABLET ORAL 2 TIMES DAILY
Qty: 30 TABLET | Refills: 3 | Status: SHIPPED | OUTPATIENT
Start: 2022-06-25 | End: 2022-07-20 | Stop reason: SDUPTHER

## 2022-06-25 RX ADMIN — LISINOPRIL 2.5 MG: 5 TABLET ORAL at 10:18

## 2022-06-25 RX ADMIN — ASPIRIN 81 MG: 81 TABLET, COATED ORAL at 10:17

## 2022-06-25 RX ADMIN — METOPROLOL SUCCINATE 25 MG: 25 TABLET, EXTENDED RELEASE ORAL at 09:17

## 2022-06-25 RX ADMIN — URSODIOL 300 MG: 300 CAPSULE ORAL at 09:20

## 2022-06-25 RX ADMIN — LISINOPRIL 2.5 MG: 5 TABLET ORAL at 09:19

## 2022-06-25 RX ADMIN — AMLODIPINE BESYLATE 5 MG: 5 TABLET ORAL at 09:19

## 2022-06-25 NOTE — CONSENT
Informed Consent for Blood Component Transfusion Note    I have discussed with the patient the rationale for blood component transfusion; its benefits in treating or preventing fatigue, organ damage, or death; and its risk which includes mild transfusion reactions, rare risk of blood borne infection, or more serious but rare reactions. I have discussed the alternatives to transfusion, including the risk and consequences of not receiving transfusion. The patient had an opportunity to ask questions and had agreed to proceed with transfusion of blood components.     Electronically signed by Wyvonne Mortimer, MD on 6/25/22 at 7:17 AM EDT

## 2022-06-25 NOTE — DISCHARGE SUMMARY
DISCHARGE SUMMARY      Patient ID:  Scottie Estes  [id number] 4 y.o. 1955    Admit date: 6/22/2022    Discharge date: 6/23/2022     Admitting Physician: Angi Valencia MD     Discharge Physician: Colin Valentin MD     Admission Diagnoses: Nonrheumatic aortic (valve) stenosis [I35.0]  Severe aortic stenosis [I35.0]    Discharge Diagnoses:   Patient Active Problem List   Diagnosis    Coagulation defect ([address]    Nonrheumatic aortic valve stenosis    S/P TAVR (transcatheter aortic valve replacement)    Severe aortic stenosis    Pseudoaneurysm [facility])        Discharged Condition: good    Hospital Course: Scottie Estes who  is a 69 y. o.year  Old female with past medical  history of severe aortic stenosis, thrombocytopenia, paroxysmal atrial fibrillation       admitted for   TAVR underwent TAVR patient also received platelet transfusion preprocedure postprocedure remained stable and was discharged  Patient was on Eliquis for PAF which has been put on hold will restart later today's  Past medical history:    has a past medical history of Asthma, Hypertension, and Obesity. Past surgical history:   has a past surgical history that includes Total hip arthroplasty. Social History:   reports that she has never smoked. She has never used smokeless tobacco. She reports previous alcohol use. She reports that she does not use drugs. Family history:  family history includes Colon Cancer in her father; Diabetes in her father; High Blood Pressure in her mother; High Cholesterol in her father and mother; Other in her father and mother; Prostate Cancer in her father; Stroke in her mother.     Consults: none    Significant Diagnostic Studies:   Echocardiography: abnormal and reviewed by myself  Stress test: Normal test  Labs:   Lab Results   Component Value Date    CREATININE 0.4 (L) 06/24/2022    BUN 11 06/24/2022     06/24/2022    K 4.7 06/24/2022     06/24/2022    CO2 25 06/24/2022      Lab Results   Component Value Date    WBC 8.9 06/24/2022    HGB 9.9 (L) 06/24/2022    HCT 29.4 (L) 06/24/2022    MCV 87.8 06/24/2022     (L) 06/24/2022      Lab Results   Component Value Date    INR 1.00 06/24/2022    PROTIME 12.9 06/24/2022      No results for input(s): TROPONINI in the last 72 hours. No results found for: BNP      Disposition: home    Patient Instructions:      Medication List      START taking these medications    amLODIPine 5 MG tablet  Commonly known as: NORVASC  Take 1 tablet by mouth daily        CONTINUE taking these medications    albuterol sulfate  (90 Base) MCG/ACT inhaler  Commonly known as: Proventil HFA  Inhale 2 puffs into the lungs every 6 hours as needed for Wheezing     aspirin 81 MG EC tablet     lisinopril 2.5 MG tablet  Commonly known as: PRINIVIL;ZESTRIL     metoprolol succinate 25 MG extended release tablet  Commonly known as: Toprol XL  Take 1 tablet by mouth daily     MULTIVITAMIN WOMEN 50+ PO     Spacer/Aero-Holding Chambers Opal  1 Device by Does not apply route daily as needed (use with albuterol rescue inhaler)     ursodiol 300 MG capsule  Commonly known as: ACTIGALL        STOP taking these medications    Eliquis 5 MG Tabs tablet  Generic drug: apixaban           Where to Get Your Medications      These medications were sent to [address] [place] [address], [address] [facility]    Phone: [phone]   · amLODIPine 5 MG tablet         Follow-up with TAVR clinic in 1 week.     Signed:  Jesse Marcelino MD, 6/25/2022, 10:03 AM

## 2022-06-25 NOTE — FLOWSHEET NOTE
Pt Given discharge instructions verbalized understanding. Pt IV's removed and dressing applied. Pt Questions answered. Pt Taken to lobby and home with family.

## 2022-06-25 NOTE — PROGRESS NOTES
Cardiology Note    Admit Date:  6/24/2022    Admission diagnosis / Complaint: hematoma to left groin    TODAY\"S PLAN: per CT Surgery no plans for surgical intervention    Subjective:  Ms. Moraima العراقي is resting in bed. Sand bag to left femoral groin. Minimal firmness noted. Patient has extensive bruising. Denies cardiac complaints. Reports that the left leg has improved significantly since yesterday. Assessment and Plan:    Left Psuedoaneurysm- per CT surgery no plans for intervention. Aortic Stenosis S/P TAVR 6/22/2022- stable. Continue to hold eliquis    HTN: BP is stable. Continue norvasc, metoprolol and lisinopril. Objective:   BP (!) 130/57   Pulse 80   Temp 98 °F (36.7 °C) (Oral)   Resp 14   Ht 5' 2\" (1.575 m)   Wt 215 lb 13.3 oz (97.9 kg)   SpO2 99%   BMI 39.48 kg/m²   No intake or output data in the 24 hours ending 06/25/22 0919    TELEMETRY: Sinus    has a past medical history of Asthma, Hypertension, and Obesity. has a past surgical history that includes Total hip arthroplasty. Physical Exam:  General:  Awake, alert, NAD  Skin:  Warm and dry. Bruising noted to pubic area and left femoral and upper thigh. No firmness noted.    Neck:  JVD not appreciated  Chest:  Clear to auscultation, respiration easy  Cardiovascular:  RRR S1S2  Abdomen:  Soft nontender  Extremities:  No edema    Medications:    aspirin  81 mg Oral Daily    amLODIPine  5 mg Oral Daily    lisinopril  2.5 mg Oral Daily    metoprolol succinate  25 mg Oral Daily    ursodiol  300 mg Oral 4x Daily    [Held by provider] enoxaparin  30 mg SubCUTAneous Daily      lactated ringers 60 mL/hr at 06/24/22 2223     albuterol sulfate HFA, HYDROcodone-acetaminophen, morphine    Lab Data:  CBC:   Recent Labs     06/23/22  0608 06/24/22  1503 06/24/22  2222   WBC 7.6 8.7 8.9   HGB 11.4* 10.6* 9.9*   HCT 34.1* 31.9* 29.4*   MCV 87.9 86.9 87.8   PLT 81* 108* 131*     BMP:   Recent Labs     06/23/22  0608 06/24/22  1503 06/24/22  2223  134* 135   K 3.9 4.5 4.7    102 102   CO2 24 24 25   BUN 9 11 11   CREATININE 0.4* 0.3* 0.4*     LIVER PROFILE:   Recent Labs     06/24/22  1503   AST 24   ALT 21   BILITOT 0.6   ALKPHOS 83     PT/INR:   Recent Labs     06/24/22  1503   PROTIME 12.9   INR 1.00     APTT:   Recent Labs     06/24/22  1503   APTT 22.3*     BNP:  No results for input(s): BNP in the last 72 hours.   TROPONIN: @TROPONINI:3@          JAMIN Pardo - CNP, APRN-CNP 6/25/2022 9:19 AM

## 2022-06-25 NOTE — CONSULTS
Cardiothoracic/vascular surgery consult    Reason for consultation:  Pseudoaneurysm    Referring physician:  Lisette Putnam MD     Primary care physician: Efra Nieves MD      Thank you for the consult. Chief Complaints :  Chief Complaint   Patient presents with    Groin Pain     recent procedure, large amount of bruising to left side        History of present illness:Jovana is a 74 y. o.year old  female with a past medical history of aortic stenosis s/p TAVR on 06/22/2022. Patient underwent tavr here on Wednesday and was discharged Thursday. Dr. Crystal Stout, Dr. Suzie Branham, and Dr. Darryl Moore were a part of the procedure. Patient was feeling well upon discharge but this morning began to develop significant pain in her left leg. While on her way to the ED with her fiance, she was noted to have syncopal episode. Patient believes it was secondary to the pain and she was feeling nauseated as well. Patient first was taken to [facility], then transferred here. Patient stated that it all began following breakfast and has continued to worsen since then. Upon admission to the hospital, it was discovered that she had a pseudoaneurysm. During interview patient was feeling well, but is still complaining of left leg pain. No chest pain, shortness of breath, fever, cough, obvious bleeding noted. Discussed with Dr. Suzie Branham about current plan. Advised that due to the location of the aneurysm that it would not be james to stent. Dr. Suzie Branham stated that he had also discussed the case with IR who did not think it would be best for them to intervene at this time as well. Past medical history:    has a past medical history of Asthma, Hypertension, and Obesity. Past surgical history:   has a past surgical history that includes Total hip arthroplasty. Social History:   reports that she has never smoked. She has never used smokeless tobacco. She reports previous alcohol use.  She reports that she does not use drugs. Family history:   no family history of CAD, STROKE of DM at early age    Allergies   Allergen Reactions    Prednisone      Has a sensitivity  Other reaction(s): Joint Pain       aspirin EC tablet 81 mg, Daily  albuterol sulfate HFA (PROVENTIL;VENTOLIN;PROAIR) 108 (90 Base) MCG/ACT inhaler 2 puff, Q6H PRN  amLODIPine (NORVASC) tablet 5 mg, Daily  lisinopril (PRINIVIL;ZESTRIL) tablet 2.5 mg, Daily  metoprolol succinate (TOPROL XL) extended release tablet 25 mg, Daily  ursodiol (ACTIGALL) capsule 300 mg, 4x Daily  HYDROcodone-acetaminophen (NORCO) 5-325 MG per tablet 1 tablet, Q6H PRN  morphine (PF) injection 2 mg, Q4H PRN  enoxaparin Sodium (LOVENOX) injection 30 mg, Daily      Current Facility-Administered Medications   Medication Dose Route Frequency Provider Last Rate Last Admin    aspirin EC tablet 81 mg  81 mg Oral Daily Edmund Rivera MD   81 mg at 06/24/22 1845    albuterol sulfate HFA (PROVENTIL;VENTOLIN;PROAIR) 108 (90 Base) MCG/ACT inhaler 2 puff  2 puff Inhalation Q6H PRN Edmund Rivera MD        amLODIPine (NORVASC) tablet 5 mg  5 mg Oral Daily Edmund Rivera MD        lisinopril (PRINIVIL;ZESTRIL) tablet 2.5 mg  2.5 mg Oral Daily Edmund Rivera MD        metoprolol succinate (TOPROL XL) extended release tablet 25 mg  25 mg Oral Daily Edmund Rivera MD        ursodiol (ACTIGALL) capsule 300 mg  300 mg Oral 4x Daily Edmund Rivera MD        HYDROcodone-acetaminophen [facility]) 5-325 MG per tablet 1 tablet  1 tablet Oral Q6H PRN Edmund Rivera MD   1 tablet at 06/24/22 1636    morphine (PF) injection 2 mg  2 mg IntraVENous Q4H PRN Edmund Rivera MD        enoxaparin Sodium (LOVENOX) injection 30 mg  30 mg SubCUTAneous Daily Edmund Rivera MD   30 mg at 06/24/22 1845       Review of Systems   Constitutional: Negative for diaphoresis, fatigue and fever ( stated felt like it yesterday). Eyes: Negative for visual disturbance.    Respiratory: Negative for chest tightness and shortness of breath. Cardiovascular: Negative for chest pain, palpitations and leg swelling. Gastrointestinal: Positive for nausea. Negative for abdominal pain, diarrhea and vomiting. Endocrine: Negative for cold intolerance and heat intolerance. Musculoskeletal: Negative for arthralgias. Left leg pain   Skin: Negative for pallor and rash. Neurological: Positive for syncope. Negative for dizziness, light-headedness and headaches. Psychiatric/Behavioral: Negative for dysphoric mood. The patient is not nervous/anxious. Physical Examination:    Vitals:    06/24/22 1505 06/24/22 1617 06/24/22 1745 06/24/22 2041   BP:  (!) 145/72 127/75 (!) 121/59   Pulse: 77 74 77 76   Resp:  16 18 15   Temp:   98.2 °F (36.8 °C) 98.3 °F (36.8 °C)   TempSrc:   Oral Oral   SpO2:  99% 98% 98%   Weight:       Height:           Physical Exam  Constitutional:       General: She is not in acute distress. Appearance: She is not diaphoretic. HENT:      Head: Normocephalic and atraumatic. Right Ear: External ear normal.      Left Ear: External ear normal.      Nose: Nose normal.      Mouth/Throat:      Mouth: Mucous membranes are moist.   Eyes:      Extraocular Movements: Extraocular movements intact. Comments: Pupils equal and round   Neck:      Vascular: No carotid bruit. Cardiovascular:      Rate and Rhythm: Normal rate and regular rhythm. Pulses:           Radial pulses are 2+ on the right side and 2+ on the left side. Dorsalis pedis pulses are 2+ on the right side and 2+ on the left side. Posterior tibial pulses are 2+ on the right side and 2+ on the left side. Heart sounds: S1 normal and S2 normal. Murmur heard. No friction rub. No gallop. Comments: Significant bruising noted bilateral lower extremities from TAVR procedure. Pulmonary:      Effort: Pulmonary effort is normal. No respiratory distress.       Breath sounds: Normal breath sounds. No rales. Chest:      Chest wall: No tenderness. Abdominal:      Palpations: Abdomen is soft. Tenderness: There is no abdominal tenderness. Musculoskeletal:      Right lower leg: No edema. Left lower leg: No edema. Comments: Tenderness to palpation of left groin. Firm on left side, soft on right. Bruising noted bilaterally   Skin:     General: Skin is warm and dry. Capillary Refill: Capillary refill takes less than 2 seconds. Findings: No rash. Comments: Skin turgor brisk   Neurological:      Mental Status: She is alert and oriented to person, place, and time. Mental status is at baseline. Psychiatric:         Behavior: Behavior is cooperative. Thought Content: Thought content normal.         Judgment: Judgment normal.            Medical decision making and Data review:    Lab Review   Recent Labs     06/24/22  1503   WBC 8.7   HGB 10.6*   HCT 31.9*   *      Recent Labs     06/24/22  1503   *   K 4.5      CO2 24   BUN 11   CREATININE 0.3*     Recent Labs     06/24/22  1503   AST 24   ALT 21   BILITOT 0.6   ALKPHOS 83     No results for input(s): TROPONINT in the last 72 hours. No results for input(s): PROBNP in the last 72 hours.   Lab Results   Component Value Date    INR 1.00 06/24/2022    PROTIME 12.9 06/24/2022       Echocardiogram complete 2D with doppler with color    Result Date: 6/23/2022  Transthoracic Echocardiography Report (TTE)  Demographics   Patient Name       Milderd Coast      Date of Study       06/23/2022   Date of Birth      1955         Gender              Female   Age                74 year(s)         Race                   Patient Number     [id number]         Room Number         2101   Visit Number       [id number]   Corporate ID       [id number]   Accession Number   [id number]         [address]   Ordering Physician Alberto Lazo MD        Physician           MD  Procedure Type of Study   TTE procedure:ECHOCARDIOGRAM COMPLETE 2D W DOPPLER W COLOR. Procedure Date Date: 2022 Start: 10:48 AM Study Location: Portable Technical Quality: Adequate visualization Indications:S/P TAVR. Patient Status: Routine Height: 63 inches Weight: 197 pounds BSA: 1.92 m2 BMI: 34.9 kg/m2 HR: 74 bpm BP: 170/54 mmHg  Conclusions   Summary  Left ventricular systolic function is normal.  Ejection fraction is visually estimated at 55-60%. Moderate left ventricular hypertrophy. Grade I diastolic dysfunction. S/p TAVR () with a 26 mm Medtronic Evolut PRO aortic valve; max PG:  15 mmHg, Mean P mmHg, ALVA: 3.64, DVI: 0.9. Trace perivalvular leak noted at the 6 o'clock position. No evidence of any pericardial effusion. Signature   ------------------------------------------------------------------  Electronically signed by Mariana Valero MD (Interpreting  physician) on 2022 at 04:25 PM  ------------------------------------------------------------------   Findings   Left Ventricle  Left ventricular systolic function is normal.  Ejection fraction is visually estimated at 55-60%. Moderate left ventricular hypertrophy. Grade I diastolic dysfunction. Left ventricle size is normal.   Left Atrium  Mildly dilated left atrium. Right Atrium  Essentially normal right atrium. Right Ventricle  Essentially normal right ventricle. Aortic Valve  S/p TAVR () with a 26 mm Medtronic Evolut PRO aortic valve; max PG:  15 mmHg, Mean P mmHg, ALVA: 3.64, DVI: 0.9. Trace perivalvular leak noted at the 6 o'clock position. Mitral Valve  Structurally normal mitral valve. Tricuspid Valve  Trace tricuspid regurgitation; normal RVSP.    Pulmonic Valve  Pulmonic valve is structurally normal.   Pericardial Effusion  No evidence of any pericardial Echocardiogram complete 2D with doppler with color    Result Date: 6/22/2022  Transthoracic Echocardiography Report (TTE)  Demographics   Patient Name       Azalea Sharp      Date of Study   Date of Birth      1955         Gender              Female   Age                74 year(s)         Race                   Patient Number     [id number]         Room Number         2101   Visit Number       [id number]   Corporate ID       [id number]   Accession Number   [id number]         Sonographer   Ordering Physician Kiran Saenz MD        Physician           Laverne RHOADES  Procedure Type of Study   TTE procedure:ECHOCARDIOGRAM COMPLETE 2D W DOPPLER W COLOR. Study Location: Cath Lab Indications: Aortic stenosis. Height: 63 inches Weight: 197 pounds BSA: 1.92 m2 BMI: 34.9 kg/m2  Conclusions   Summary  in epic   Signature   ------------------------------------------------------------------  Electronically signed by Dorinda Ordonez MD  (Interpreting physician) on 06/22/2022 at 11:22 AM  ------------------------------------------------------------------      Echocardiogram limited    Result Date: 6/22/2022  Transthoracic Echocardiography Report (TTE)  Demographics   Patient Name       Azalea Sharp      Date of Study       06/22/2022   Date of Birth      1955         Gender              Female   Age                74 year(s)         Race                   Patient Number     [id number]         Room Number         2101   Visit Number       [id number]   Corporate ID       [id number]   Accession Number   [id number]         [employer],                                                            [address] [place]   Ordering Physician Barbara Coburn MD                 Physician           MD  Procedure Type of Study   TTE procedure:ECHOCARDIOGRAM LIMITED. Procedure Date Date: 2022 Start: 06:18 AM Study Location: Portable Technical Quality: Adequate visualization Indications:TAVR procedure. Patient Status: Routine Height: 63 inches Weight: 197 pounds BSA: 1.92 m2 BMI: 34.9 kg/m2 BP: 172/78 mmHg  Conclusions   Summary  Intra-Op echo for TAVR procedure. Initial:  Left ventricular systolic function is hyperdynamic. Ejection fraction is visually estimated at >60%. Severe aortic stenosis (Max P mmHg, Mean P mmHg). Trace mitral regurgitation. Post:  Successful deployment of a 26 mm Medtronic Evolut PRO aortic valve (Max P mmHg, Mean P mmHg). Trace perivalvular leak noted at the 6 o'clock position. Trace mitral regurgitation. Signature   ------------------------------------------------------------------  Electronically signed by Siva Payne MD (Interpreting  physician) on 2022 at 01:53 PM  ------------------------------------------------------------------   Findings   Mitral Valve  Mitral annular calcification is present. Tricuspid Valve  Trace tricuspid regurgitation; RVSP: 29 mmHg. Pericardial Effusion  No evidence of any pericardial effusion.   M-Mode/2D Measurements & Calculations   LV Diastolic Dimension:   LV Systolic Dimension:   AO Root Dimension: 2.7 cm  3.53 cm                   1.99 cm  LV FS:43.6 %              LV Volume Diastolic: 88  LV PW Diastolic: 4.01 cm  ml  LV PW Systolic: 3.13 cm   LV Volume Systolic: 43  Septum Diastolic: 6.37 cm ml  Septum Systolic: 1.4 cm   LV EDV/LV EDV Index: 88                            ml/46 m2LV ESV/LV ESV                            Index: 43 ml/22 m2  LV Area Diastolic: 01.0   EF Calculated (A4C):  cm2                       51.1 %  LV Area Systolic: 13.6    EF Calculated (2D): 75.7  cm2                       %                             LV Length: 8.78 cm                             LVOT: 1.8 cm  Doppler Measurements & Calculations                           AV Peak Velocity: 458 cm/s  LVOT Peak Velocity: 136  MV Mean Gradient: 4     AV Peak Gradient: 83.91     cm/s  mmHg                    mmHg                        LVOT Mean Velocity: 91.5  MV Mean Velocity: 98.1  AV Mean Velocity: 334 cm/s  cm/s  cm/s                    AV Mean Gradient: 49 mmHg   LVOT Peak Gradient: 7                          AV VTI: 111 cm              mmHgLVOT Mean Gradient:  MV Area (continuity):   AV Area (Continuity):0.77   4 mmHg  2.1 cm2                 cm2                         Estimated RVSP: 29 mmHg                                                      Estimated RAP:3 mmHg                          LVOT VTI: 33.4 cm                           Estimated PASP: 29.01 mmHg  TR Velocity:255 cm/s                                                      TR Gradient:26.01 mmHg      VL DUP LOWER EXTREMITY ARTERIES LEFT    Result Date: 6/24/2022  EXAMINATION: ARTERIAL DUPLEX ULTRASOUND OF THE  LOWER EXTREMITY  6/24/2022 2:58 pm TECHNIQUE: Duplex ultrasound using B-mode/gray scaled imaging, Doppler spectral analysis and color flow Doppler was obtained of the lower extremity. COMPARISON: None. HISTORY: ORDERING SYSTEM PROVIDED HISTORY: s/p tavr, rule out pseudoaneurysm TECHNOLOGIST PROVIDED HISTORY: Reason for exam:->s/p tavr, rule out pseudoaneurysm Reason for Exam: left groin pain, s/p TAVR FINDINGS: Left groin vascular ultrasound demonstrates 2 pseudoaneurysms, 1 close to the common femoral artery near the femoral bifurcation and the 2nd more anteriorly. Defer pseudoaneurysm measures approximately 1.4 x 0.9 cm in the 2nd 1.3 x 5.6 cm. Left groin femoral pseudoaneurysms which are fairly small and should be able to be compressed and eliminated. Flow in the femoral circulation remains adequate.      XR CHEST PORTABLE    Result Date: 6/23/2022  EXAMINATION: ONE XRAY VIEW OF THE CHEST 6/23/2022 4:19 am COMPARISON: 06/16/2022 chest radiograph HISTORY: ORDERING SYSTEM PROVIDED HISTORY: S/P TAVR TECHNOLOGIST PROVIDED HISTORY: Reason for exam:->S/P TAVR Reason for Exam: S/P TAVR FINDINGS: The cardiomediastinal silhouette is within normal limits of size and contour. Status post aortic valve replacement. No focal airspace disease. No pleural effusion or pneumothorax. No acute osseous abnormality. .     Status post TAVR without evidence of acute cardiopulmonary disease. XR CHEST 1 VIEW    Result Date: 6/18/2022  EXAMINATION: ONE XRAY VIEW OF THE CHEST 6/16/2022 7:35 am COMPARISON: October 7, 2021. HISTORY: ORDERING SYSTEM PROVIDED HISTORY: pre tAVR TECHNOLOGIST PROVIDED HISTORY: Reason for exam:->pre tAVR Reason for Exam: pre tAVR FINDINGS: A single upright frontal view chest radiograph was obtained. The heart size, mediastinal contour, and pleural spaces are within normal limits. The lungs are clear. There is no focal consolidation or pneumothorax. The pulmonary vascular pattern is within normal limits. No acute thoracic osseous abnormality. Clear lungs. No acute cardiopulmonary abnormality. CTA LOWER EXTREMITY LEFT W CONTRAST    Result Date: 6/24/2022  EXAMINATION: CTA OF THE LEFT LOWER EXTREMITY 6/24/2022 7:21 pm TECHNIQUE: CTA of the left lower extremity was performed after the administration of intravenous contrast. Multiplanar reformatted images are provided for review. MIP images are provided for review. Automated exposure control, iterative reconstruction, and/or weight based adjustment of the mA/kV was utilized to reduce the radiation dose to as low as reasonably achievable. COMPARISON: None. HISTORY ORDERING SYSTEM PROVIDED HISTORY: pseudoaneurysm on US from recent TAVR; needed for possible IR planning TECHNOLOGIST PROVIDED HISTORY: Reason for exam:->pseudoaneurysm on US from recent TAVR; needed for possible IR planning Reason for Exam: pseudoaneurysm on US from recent TAVR; needed for possible IR planning FINDINGS: The abdominal aorta and bilateral common iliac arteries appear unremarkable.  The left common iliac, internal and external iliac arteries appear normal. The left common iliac artery demonstrates two pseudoaneurysms in the left groin. These two appear to communicate with each other. The pseudoaneurysm closer to the femoral artery measures 1.2 cm and the pseudoaneurysm distal to the common femoral artery measures 1.1 cm. The left femoral circulation is continuous. The SFA and profunda femoris artery appear intact. The left popliteal artery and trifurcation vessels appear intact and continuous although the arteries fade due to dilution of contrast around the ankle. 1. Evidence of two pseudoaneurysms adjacent to each other in the left groin coming off the common femoral artery. The inflow including the aorta and iliac circulation as well as outflow including SFA, profunda femoris popliteal and trifurcation vessels are all patent. Findings discussed with DR. Vaughn Clark on 06/24/2022 at 8 24 p.m. All labs, medications and tests reviewed by myself including data  from outside source , patient and available family . Continue all other medications of all above medical condition listed as is. Impression:  Principal Problem:    Pseudoaneurysm ([address]  Active Problems:    S/P TAVR (transcatheter aortic valve replacement)    Severe aortic stenosis    Coagulation defect (HCC)    Nonrheumatic aortic valve stenosis  Resolved Problems:    * No resolved hospital problems. *      Assessment and plan: 74 y. o.year old with   1. Psuedoaneurysm x 2  -Ultrasound and CTA per above. Pseudoaneurysms noted around common femoral artery. NPO after midnight. Possible surgery tomorrow. Patient receiving norco  2. Aortic stenosis s/p TAVR 06/22/2022  -Cardiology on board. Eliquis on hold  3. Hypertension  -On norvasc, metoprolol and lisinopril. Patient seen and examined by Dr. Shane Romano and myself. Plan was discussed with him and he agreed.  Thank you  much for consult and giving us the opportunity in contributing in the care of this patient. Please feel free to call me for any questions.        Jonathan Hernandze PA-C, 6/24/2022 8:55 PM

## 2022-06-25 NOTE — DISCHARGE SUMMARY
[facility]  Discharge Summary    Jojo Watkins  :  1955  MRN:  [id number]    Admit date:  2022  Discharge date:      Admitting Physician:  Alex Mackay MD    Discharge Diagnoses:    Patient Active Problem List   Diagnosis    Coagulation defect [facility])    Nonrheumatic aortic valve stenosis    S/P TAVR (transcatheter aortic valve replacement)    Severe aortic stenosis    Pseudoaneurysm ([address]       Admission Condition:  good    Discharged Condition:  good    Hospital Course:   Admitted for observation for left groin pain post TAVR, work up revealed small pseudo aneurism  D/W IR and CT surgery , conservative management was preferred and decided since the pseudoaneurysm is only  1.2 cm and 1.1 cm bilobed   HCT is stable   Stop eliquis for now    Discharge Medications:      Medication List      CONTINUE taking these medications    albuterol sulfate  (90 Base) MCG/ACT inhaler  Commonly known as: Proventil HFA  Inhale 2 puffs into the lungs every 6 hours as needed for Wheezing     amLODIPine 5 MG tablet  Commonly known as: NORVASC  Take 1 tablet by mouth daily     aspirin 81 MG EC tablet     lisinopril 2.5 MG tablet  Commonly known as: PRINIVIL;ZESTRIL     metoprolol succinate 25 MG extended release tablet  Commonly known as:  Toprol XL  Take 1 tablet by mouth daily     MULTIVITAMIN WOMEN 50+ PO     Spacer/Aero-Holding [employer]  1 Device by Does not apply route daily as needed (use with albuterol rescue inhaler)     ursodiol 300 MG capsule  Commonly known as: ACTIGALL        STOP taking these medications    apixaban 5 MG Tabs tablet  Commonly known as: Eliquis            Consults:  CT surgery/ IR surgery    Significant Diagnostic Studies:  See angiogram     Treatments:       Discharge Exam:  BP (!) 130/57   Pulse 80   Temp 98 °F (36.7 °C) (Oral)   Resp 14   Ht 5' 2\" (1.575 m)   Wt 215 lb 13.3 oz (97.9 kg)   SpO2 99%   BMI 39.48 kg/m²     General Appearance:    Alert, cooperative, no distress, appears stated age   Head:    Normocephalic, without obvious abnormality, atraumatic   Eyes:    PERRL, conjunctiva/corneas clear, EOM's intact, fundi     benign, both eyes   Ears:    Normal TM's and external ear canals, both ears   Nose:   Nares normal, septum midline, mucosa normal, no drainage    or sinus tenderness   Throat:   Lips, mucosa, and tongue normal; teeth and gums normal   Neck:   Supple, symmetrical, trachea midline, no adenopathy;     thyroid:  no enlargement/tenderness/nodules; no carotid    bruit or JVD   Back:     Symmetric, no curvature, ROM normal, no CVA tenderness   Lungs:     Clear to auscultation bilaterally, respirations unlabored   Chest Wall:    No tenderness or deformity    Heart:    Regular rate and rhythm, S1 and S2 normal, no murmur, rub   or gallop       Abdomen:     Soft, non-tender, bowel sounds active all four quadrants,     no masses, no organomegaly           Extremities:   Left groin hematoma Extremities normal, atraumatic, no cyanosis or edema   Pulses:   2+ and symmetric all extremities   Skin:   Skin color, texture, turgor normal, no rashes or lesions   Lymph nodes:   Cervical, supraclavicular, and axillary nodes normal   Neurologic:   CNII-XII intact, normal strength, sensation and reflexes     throughout       Disposition:   home    Signed:  Molly Hoover MD  6/25/2022, 9:58 AM

## 2022-06-27 ENCOUNTER — TELEPHONE (OUTPATIENT)
Dept: FAMILY MEDICINE CLINIC | Age: 67
End: 2022-06-27

## 2022-06-27 LAB
EKG ATRIAL RATE: 76 BPM
EKG DIAGNOSIS: NORMAL
EKG P AXIS: 29 DEGREES
EKG P-R INTERVAL: 178 MS
EKG Q-T INTERVAL: 408 MS
EKG QRS DURATION: 106 MS
EKG QTC CALCULATION (BAZETT): 459 MS
EKG R AXIS: -26 DEGREES
EKG T AXIS: 45 DEGREES
EKG VENTRICULAR RATE: 76 BPM

## 2022-06-27 PROCEDURE — 93010 ELECTROCARDIOGRAM REPORT: CPT | Performed by: INTERNAL MEDICINE

## 2022-06-27 RX ORDER — ASPIRIN 325 MG
81 TABLET ORAL DAILY
Status: ON HOLD | COMMUNITY
End: 2022-07-27 | Stop reason: ALTCHOICE

## 2022-06-27 NOTE — TELEPHONE ENCOUNTER
Patient states she is in the process of moving to [place] and is looking for a family doctor there. She would like to just wait on making an appt until she finds a new doctor.

## 2022-06-27 NOTE — TELEPHONE ENCOUNTER
Prabhu [facility] Transitions Initial Follow Up Call    Outreach made within 2 business days of discharge: Yes    Patient: Mary Ku Patient : 1955   MRN: [id number]  Reason for Admission: There are no discharge diagnoses documented for the most recent discharge. Discharge Date: 22       Spoke with: Self    Discharge department/facility: [facility]    TCM Interactive Patient Contact:  Was patient able to fill all prescriptions: Yes  Was patient instructed to bring all medications to the follow-up visit: Yes  Is patient taking all medications as directed in the discharge summary?  Yes  Does patient understand their discharge instructions: Yes  Does patient have questions or concerns that need addressed prior to 7-14 day follow up office visit: no    Scheduled appointment with PCP within 7-14 days    Follow Up  Future Appointments   Date Time Provider Olga Cotton   2022  8:05 AM [address] Al[address] 1200 [facility]   2022  8:00 AM [facility] ROOM 1 [facility]   2022 10:00 AM Simone Ascencio 39 [facility]   2022  9:15 AM SCHEDULE, 1200 [facility] MED ONC LAB 1200 [facility] MED ONC [place]   2022  9:00 AM 1200 [facility], MED ONC NURSE 1200 [facility] MED ONC [place]   2022  9:15 AM Elder Lyles MD 2316 [facility]       Luis F Beasley MA

## 2022-06-28 LAB
ABO/RH: NORMAL
ANTIBODY SCREEN: NEGATIVE
COMPONENT: NORMAL
COMPONENT: NORMAL
CROSSMATCH RESULT: NORMAL
CROSSMATCH RESULT: NORMAL
STATUS: NORMAL
STATUS: NORMAL
TRANSFUSION STATUS: NORMAL
TRANSFUSION STATUS: NORMAL
UNIT DIVISION: 0
UNIT DIVISION: 0
UNIT NUMBER: NORMAL
UNIT NUMBER: NORMAL

## 2022-06-29 ENCOUNTER — HOSPITAL ENCOUNTER (OUTPATIENT)
Dept: CARDIAC CATH/INVASIVE PROCEDURES | Age: 67
Discharge: HOME | End: 2022-06-29
Attending: THORACIC SURGERY (CARDIOTHORACIC VASCULAR SURGERY) | Admitting: THORACIC SURGERY (CARDIOTHORACIC VASCULAR SURGERY)
Payer: MEDICARE

## 2022-06-29 ENCOUNTER — APPOINTMENT (OUTPATIENT)
Dept: ULTRASOUND IMAGING | Age: 67
End: 2022-06-29
Attending: THORACIC SURGERY (CARDIOTHORACIC VASCULAR SURGERY)
Payer: MEDICARE

## 2022-06-29 ENCOUNTER — HOSPITAL ENCOUNTER (OUTPATIENT)
Age: 67
Discharge: HOME | End: 2022-06-29
Payer: MEDICARE

## 2022-06-29 VITALS
HEART RATE: 70 BPM | RESPIRATION RATE: 14 BRPM | DIASTOLIC BLOOD PRESSURE: 60 MMHG | SYSTOLIC BLOOD PRESSURE: 136 MMHG | BODY MASS INDEX: 35.33 KG/M2 | WEIGHT: 192 LBS | OXYGEN SATURATION: 100 % | HEIGHT: 62 IN

## 2022-06-29 LAB
ALBUMIN SERPL-MCNC: 4.2 GM/DL (ref 3.4–5)
ALP BLD-CCNC: 114 IU/L (ref 40–128)
ALT SERPL-CCNC: 22 U/L (ref 10–40)
ANION GAP SERPL CALCULATED.3IONS-SCNC: 9 MMOL/L (ref 4–16)
APTT: 24.1 SECONDS (ref 25.1–37.1)
AST SERPL-CCNC: 22 IU/L (ref 15–37)
BILIRUB SERPL-MCNC: 1 MG/DL (ref 0–1)
BUN BLDV-MCNC: 12 MG/DL (ref 6–23)
CALCIUM SERPL-MCNC: 9.5 MG/DL (ref 8.3–10.6)
CHLORIDE BLD-SCNC: 99 MMOL/L (ref 99–110)
CO2: 25 MMOL/L (ref 21–32)
CREAT SERPL-MCNC: 0.4 MG/DL (ref 0.6–1.1)
GFR AFRICAN AMERICAN: >60 ML/MIN/1.73M2
GFR NON-AFRICAN AMERICAN: >60 ML/MIN/1.73M2
GLUCOSE BLD-MCNC: 180 MG/DL (ref 70–99)
HCT VFR BLD CALC: 32.5 % (ref 37–47)
HEMOGLOBIN: 10.3 GM/DL (ref 12.5–16)
INR BLD: 0.93 INDEX
MCH RBC QN AUTO: 29.1 PG (ref 27–31)
MCHC RBC AUTO-ENTMCNC: 31.7 % (ref 32–36)
MCV RBC AUTO: 91.8 FL (ref 78–100)
PDW BLD-RTO: 15.6 % (ref 11.7–14.9)
PLATELET # BLD: 173 K/CU MM (ref 140–440)
PMV BLD AUTO: 10.7 FL (ref 7.5–11.1)
POTASSIUM SERPL-SCNC: 4.1 MMOL/L (ref 3.5–5.1)
PRO-BNP: 43.87 PG/ML
PROTHROMBIN TIME: 12 SECONDS (ref 11.7–14.5)
RBC # BLD: 3.54 M/CU MM (ref 4.2–5.4)
SODIUM BLD-SCNC: 133 MMOL/L (ref 135–145)
TOTAL PROTEIN: 7.2 GM/DL (ref 6.4–8.2)
WBC # BLD: 6.2 K/CU MM (ref 4–10.5)

## 2022-06-29 PROCEDURE — 93926 LOWER EXTREMITY STUDY: CPT

## 2022-06-29 PROCEDURE — 83880 ASSAY OF NATRIURETIC PEPTIDE: CPT

## 2022-06-29 PROCEDURE — 85027 COMPLETE CBC AUTOMATED: CPT

## 2022-06-29 PROCEDURE — 80053 COMPREHEN METABOLIC PANEL: CPT

## 2022-06-29 PROCEDURE — 93005 ELECTROCARDIOGRAM TRACING: CPT | Performed by: THORACIC SURGERY (CARDIOTHORACIC VASCULAR SURGERY)

## 2022-06-29 PROCEDURE — 85610 PROTHROMBIN TIME: CPT

## 2022-06-29 PROCEDURE — 85730 THROMBOPLASTIN TIME PARTIAL: CPT

## 2022-06-29 PROCEDURE — 36415 COLL VENOUS BLD VENIPUNCTURE: CPT

## 2022-06-29 PROCEDURE — 99214 OFFICE O/P EST MOD 30 MIN: CPT | Performed by: INTERNAL MEDICINE

## 2022-06-29 NOTE — PROGRESS NOTES
CARDIOLOGY NOTE      6/29/2022    RE: Gricelda Nation  ([birth year])                               TO:  Dr. Denisse Denton MD            CHIEF COMPLAINT   Ness Larson is a 69 y.o. female who was seen today for management of post TAVR                                    HPI:                   Pt has h/o TAVR last week, paroxysmal atrial fibrillation, thrombocytopenia, seen today for post TAVR. Pt has developed a small pseudoaneurysm post TAVR on the left side which was not the TAVR access site today she is here for follow-up had an ultrasound done sides of the aneurysm has not changed compression was done and the aneurysm resolved  No symptoms denies any pain in the groin    Gricelda Nation has the following history recorded in care path:  Patient Active Problem List    Diagnosis Date Noted    Pseudoaneurysm ([address] 06/24/2022    Severe aortic stenosis 06/22/2022    S/P TAVR (transcatheter aortic valve replacement)     Nonrheumatic aortic valve stenosis 03/30/2022    Coagulation defect ([address] 07/07/2021     No current facility-administered medications for this encounter.      Allergies: Prednisone  Past Medical History:   Diagnosis Date    Asthma     Hypertension     Obesity      Past Surgical History:   Procedure Laterality Date    TOTAL HIP ARTHROPLASTY        As reviewed   Family History   Problem Relation Age of Onset    Other Mother         lymphoma    Stroke Mother     High Blood Pressure Mother     High Cholesterol Mother     Prostate Cancer Father     Colon Cancer Father     Other Father         Melanoma skin cancer    Diabetes Father     High Cholesterol Father      Social History     Tobacco Use    Smoking status: Never Smoker    Smokeless tobacco: Never Used   Substance Use Topics    Alcohol use: Not Currently        Objective:    Vitals:    06/29/22 0846   BP: 136/60   Pulse: 70   Resp: 14   SpO2: 100%   Weight: 192 lb (87.1 kg)   Height: 5' 2\" (1.575 m)     /60   Pulse 70 Resp 14   Ht 5' 2\" (1.575 m)   Wt 192 lb (87.1 kg)   SpO2 100%   BMI 35.12 kg/m²     No flowsheet data found. Wt Readings from Last 3 Encounters:   06/29/22 192 lb (87.1 kg)   06/25/22 215 lb 13.3 oz (97.9 kg)   06/23/22 201 lb 4.5 oz (91.3 kg)     Body mass index is 35.12 kg/m². GENERAL - Alert, oriented, pleasant, in no apparent distress. EYES: No jaundice, no conjunctival pallor. SKIN: It is warm & dry. No rashes. No Echhymosis    HEENT - No clinically significant abnormalities seen. Neck - Supple. No jugular venous distention noted. No carotid bruits. Cardiovascular - Normal S1 and S2 without obvious murmur or gallop. Extremities - No cyanosis, clubbing, or significant edema. There is a bruise on the left inner thigh there is no induration or tenderness there  Pulmonary - No respiratory distress. No wheezes or rales. Abdomen - No masses, tenderness, or organomegaly. Musculoskeletal - No significant edema. No joint deformities. No muscle wasting. Neurologic - Cranial nerves II through XII are grossly intact. There were no gross focal neurologic abnormalities.     Lab Review   No results found for: CKTOTAL, CKMB, CKMBINDEX, TROPONINT  BNP:  No results found for: BNP  PT/INR:    Lab Results   Component Value Date    INR 0.93 06/29/2022     No results found for: LABA1C  Lab Results   Component Value Date    WBC 6.2 06/29/2022    HCT 32.5 (L) 06/29/2022    MCV 91.8 06/29/2022     06/29/2022     No results found for: CHOL, TRIG, HDL, LDLCALC, LDLDIRECT, CHOLHDLRATIO  Lab Results   Component Value Date    ALT 22 06/29/2022    AST 22 06/29/2022     BMP:    Lab Results   Component Value Date     06/29/2022    K 4.1 06/29/2022    CL 99 06/29/2022    CO2 25 06/29/2022    BUN 12 06/29/2022    CREATININE 0.4 06/29/2022     CMP:   Lab Results   Component Value Date     06/29/2022    K 4.1 06/29/2022    CL 99 06/29/2022    CO2 25 06/29/2022    BUN 12 06/29/2022    PROT 7.2 06/29/2022 TSH:  No results found for: TSH, TSHHS        Assessment & Plan:    -Post TAVR no issues no chest pain  -Pseudoaneurysm on the left groin resolved after compression  -Continue aspirin we will start the Eliquis from next week that is that is July 6  We will continue to closely monitor the groin  -PAF patient has not had any atrial fibrillation        Juarez Velazquez MD    [facility] [place]    Please note this report has been partially produced using speech recognition software and may contain errors related to that system including errors in grammar, punctuation, and spelling, as well as words and phrases that may be inappropriate. If there are any questions or concerns please feel free to contact the dictating provider for clarification.

## 2022-06-30 LAB
EKG ATRIAL RATE: 78 BPM
EKG DIAGNOSIS: NORMAL
EKG P AXIS: 33 DEGREES
EKG P-R INTERVAL: 196 MS
EKG Q-T INTERVAL: 384 MS
EKG QRS DURATION: 96 MS
EKG QTC CALCULATION (BAZETT): 437 MS
EKG R AXIS: -17 DEGREES
EKG T AXIS: 31 DEGREES
EKG VENTRICULAR RATE: 78 BPM

## 2022-06-30 PROCEDURE — 93010 ELECTROCARDIOGRAM REPORT: CPT | Performed by: INTERNAL MEDICINE

## 2022-07-20 RX ORDER — LISINOPRIL 2.5 MG/1
2.5 TABLET ORAL 2 TIMES DAILY
Qty: 30 TABLET | Refills: 3 | Status: SHIPPED | OUTPATIENT
Start: 2022-07-20 | End: 2022-07-25 | Stop reason: SDUPTHER

## 2022-07-25 RX ORDER — LISINOPRIL 2.5 MG/1
2.5 TABLET ORAL 2 TIMES DAILY
Qty: 60 TABLET | Refills: 5 | Status: SHIPPED | OUTPATIENT
Start: 2022-07-25

## 2022-07-27 ENCOUNTER — HOSPITAL ENCOUNTER (OUTPATIENT)
Dept: NON INVASIVE DIAGNOSTICS | Age: 67
Discharge: HOME | End: 2022-07-27
Payer: MEDICARE

## 2022-07-27 ENCOUNTER — HOSPITAL ENCOUNTER (OUTPATIENT)
Age: 67
Discharge: HOME | End: 2022-07-27
Payer: MEDICARE

## 2022-07-27 ENCOUNTER — HOSPITAL ENCOUNTER (OUTPATIENT)
Dept: CARDIAC CATH/INVASIVE PROCEDURES | Age: 67
Discharge: HOME | End: 2022-07-27
Attending: THORACIC SURGERY (CARDIOTHORACIC VASCULAR SURGERY) | Admitting: THORACIC SURGERY (CARDIOTHORACIC VASCULAR SURGERY)
Payer: MEDICARE

## 2022-07-27 VITALS
BODY MASS INDEX: 34.6 KG/M2 | SYSTOLIC BLOOD PRESSURE: 138 MMHG | DIASTOLIC BLOOD PRESSURE: 66 MMHG | RESPIRATION RATE: 14 BRPM | HEART RATE: 77 BPM | OXYGEN SATURATION: 100 % | HEIGHT: 62 IN | WEIGHT: 188 LBS

## 2022-07-27 LAB
ALBUMIN SERPL-MCNC: 4.3 GM/DL (ref 3.4–5)
ALP BLD-CCNC: 116 IU/L (ref 40–129)
ALT SERPL-CCNC: 23 U/L (ref 10–40)
ANION GAP SERPL CALCULATED.3IONS-SCNC: 10 MMOL/L (ref 4–16)
APTT: 30.2 SECONDS (ref 25.1–37.1)
AST SERPL-CCNC: 27 IU/L (ref 15–37)
BILIRUB SERPL-MCNC: 0.4 MG/DL (ref 0–1)
BUN BLDV-MCNC: 16 MG/DL (ref 6–23)
CALCIUM SERPL-MCNC: 10.1 MG/DL (ref 8.3–10.6)
CHLORIDE BLD-SCNC: 106 MMOL/L (ref 99–110)
CO2: 26 MMOL/L (ref 21–32)
CREAT SERPL-MCNC: 0.5 MG/DL (ref 0.6–1.1)
GFR AFRICAN AMERICAN: >60 ML/MIN/1.73M2
GFR NON-AFRICAN AMERICAN: >60 ML/MIN/1.73M2
GLUCOSE BLD-MCNC: 149 MG/DL (ref 70–99)
HCT VFR BLD CALC: 39.4 % (ref 37–47)
HEMOGLOBIN: 12.5 GM/DL (ref 12.5–16)
INR BLD: 1.22 INDEX
LV EF: 58 %
LVEF MODALITY: NORMAL
MCH RBC QN AUTO: 29.4 PG (ref 27–31)
MCHC RBC AUTO-ENTMCNC: 31.7 % (ref 32–36)
MCV RBC AUTO: 92.7 FL (ref 78–100)
PDW BLD-RTO: 14.4 % (ref 11.7–14.9)
PLATELET # BLD: 102 K/CU MM (ref 140–440)
PMV BLD AUTO: 11.1 FL (ref 7.5–11.1)
POTASSIUM SERPL-SCNC: 4.9 MMOL/L (ref 3.5–5.1)
PRO-BNP: 29.36 PG/ML
PROTHROMBIN TIME: 15.8 SECONDS (ref 11.7–14.5)
RBC # BLD: 4.25 M/CU MM (ref 4.2–5.4)
SODIUM BLD-SCNC: 142 MMOL/L (ref 135–145)
TOTAL PROTEIN: 7.1 GM/DL (ref 6.4–8.2)
WBC # BLD: 4.9 K/CU MM (ref 4–10.5)

## 2022-07-27 PROCEDURE — 80053 COMPREHEN METABOLIC PANEL: CPT

## 2022-07-27 PROCEDURE — 83880 ASSAY OF NATRIURETIC PEPTIDE: CPT

## 2022-07-27 PROCEDURE — 85730 THROMBOPLASTIN TIME PARTIAL: CPT

## 2022-07-27 PROCEDURE — 85027 COMPLETE CBC AUTOMATED: CPT

## 2022-07-27 PROCEDURE — 93306 TTE W/DOPPLER COMPLETE: CPT

## 2022-07-27 PROCEDURE — 36415 COLL VENOUS BLD VENIPUNCTURE: CPT

## 2022-07-27 PROCEDURE — 93005 ELECTROCARDIOGRAM TRACING: CPT | Performed by: THORACIC SURGERY (CARDIOTHORACIC VASCULAR SURGERY)

## 2022-07-27 PROCEDURE — 99213 OFFICE O/P EST LOW 20 MIN: CPT | Performed by: INTERNAL MEDICINE

## 2022-07-27 PROCEDURE — 85610 PROTHROMBIN TIME: CPT

## 2022-07-27 RX ORDER — ASPIRIN 81 MG/1
81 TABLET, CHEWABLE ORAL DAILY
COMMUNITY

## 2022-07-27 RX ORDER — FUROSEMIDE 20 MG/1
20 TABLET ORAL SEE ADMIN INSTRUCTIONS
COMMUNITY

## 2022-07-27 NOTE — PROGRESS NOTES
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 Pt has  ***    Vikki Grimaldo has the following history recorded in care path:  Patient Active Problem List    Diagnosis Date Noted    Pseudoaneurysm ([address] 06/24/2022    Severe aortic stenosis 06/22/2022    S/P TAVR (transcatheter aortic valve replacement)     Nonrheumatic aortic valve stenosis 03/30/2022    Coagulation defect ([address] 07/07/2021     No current facility-administered medications for this encounter. No current outpatient medications on file. Allergies: Prednisone  Past Medical History:   Diagnosis Date    Asthma     Hypertension     Obesity      Past Surgical History:   Procedure Laterality Date    TOTAL HIP ARTHROPLASTY        As reviewed   Family History   Problem Relation Age of Onset    Other Mother         lymphoma    Stroke Mother     High Blood Pressure Mother     High Cholesterol Mother     Prostate Cancer Father     Colon Cancer Father     Other Father         Melanoma skin cancer    Diabetes Father     High Cholesterol Father      Social History     Tobacco Use    Smoking status: Never    Smokeless tobacco: Never   Substance Use Topics    Alcohol use: Not Currently      ***  Objective:    Vitals:    06/23/22 0700 06/23/22 0740 06/23/22 0900 06/23/22 1015   BP: (!) 163/68  (!) 157/65 (!) 170/54   Pulse: 83 86 79 78   Resp: 16 27 16 20   Temp:       TempSrc:       SpO2: 97% 96% 94%    Weight:       Height:         BP (!) 170/54   Pulse 78   Temp 99.1 °F (37.3 °C) (Oral)   Resp 20   Ht 5' 3\" (1.6 m)   Wt 201 lb 4.5 oz (91.3 kg)   SpO2 94%   BMI 35.66 kg/m²     No flowsheet data found.      Wt Readings from Last 3 Encounters:   07/27/22 188 lb (85.3 kg)   06/29/22 192 lb (87.1 kg)   06/25/22 215 lb 13.3 oz (97.9 kg)     Body mass index is 35.66 kg/m². GENERAL - Alert, oriented, pleasant, in no apparent distress. EYES: No jaundice, no conjunctival pallor. SKIN: It is warm & dry. No rashes. No Echhymosis    HEENT - No clinically significant abnormalities seen. Neck - Supple. No jugular venous distention noted. No carotid bruits. Cardiovascular - Normal S1 and S2 without obvious murmur or gallop. Extremities - No cyanosis, clubbing, or significant edema. Pulmonary - No respiratory distress. No wheezes or rales. Abdomen - No masses, tenderness, or organomegaly. Musculoskeletal - No significant edema. No joint deformities. No muscle wasting. Neurologic - Cranial nerves II through XII are grossly intact. There were no gross focal neurologic abnormalities.     Lab Review   No results found for: CKTOTAL, CKMB, CKMBINDEX, TROPONINT  BNP:  No results found for: BNP  PT/INR:    Lab Results   Component Value Date    INR 1.22 07/27/2022     No results found for: LABA1C  Lab Results   Component Value Date    WBC 6.2 06/29/2022    HCT 32.5 (L) 06/29/2022    MCV 91.8 06/29/2022     06/29/2022     No results found for: CHOL, TRIG, HDL, LDLCALC, LDLDIRECT, CHOLHDLRATIO  Lab Results   Component Value Date    ALT 23 07/27/2022    AST 27 07/27/2022     BMP:    Lab Results   Component Value Date/Time     07/27/2022 08:54 AM    K 4.9 07/27/2022 08:54 AM     07/27/2022 08:54 AM    CO2 26 07/27/2022 08:54 AM    BUN 16 07/27/2022 08:54 AM    CREATININE 0.5 07/27/2022 08:54 AM     CMP:   Lab Results   Component Value Date/Time     07/27/2022 08:54 AM    K 4.9 07/27/2022 08:54 AM     07/27/2022 08:54 AM    CO2 26 07/27/2022 08:54 AM    BUN 16 07/27/2022 08:54 AM    PROT 7.1 07/27/2022 08:54 AM     TSH:  No results found for: TSH, TSHHS        Assessment & Plan:    ***        Hima Martin MD    [facility] [place]    Please note this report has been partially produced using

## 2022-07-27 NOTE — PROGRESS NOTES
CARDIOLOGY TAVR CLINIC   NOTE    Chief Complaint: POST TAVR    HPI:   Tess Miranda is a 69y.o. year old who has Past medical history as noted below. breathing better, derrickin is fine, can walk more , not using lasix       No current facility-administered medications for this encounter. Current Outpatient Medications   Medication Sig Dispense Refill    aspirin 81 MG chewable tablet Take 81 mg by mouth in the morning. apixaban (ELIQUIS) 5 MG TABS tablet Take 5 mg by mouth in the morning and 5 mg before bedtime.       furosemide (LASIX) 20 MG tablet Take 20 mg by mouth See Admin Instructions Daily as needed      lisinopril (PRINIVIL;ZESTRIL) 2.5 MG tablet Take 1 tablet by mouth in the morning and at bedtime 60 tablet 5    amLODIPine (NORVASC) 5 MG tablet Take 1 tablet by mouth daily 30 tablet 3    metoprolol succinate (TOPROL XL) 25 MG extended release tablet Take 1 tablet by mouth daily 30 tablet 3    albuterol sulfate HFA (PROVENTIL HFA) 108 (90 Base) MCG/ACT inhaler Inhale 2 puffs into the lungs every 6 hours as needed for Wheezing 3 each 3    ursodiol (ACTIGALL) 300 MG capsule Take 1 capsule by mouth 4 times daily      Multiple Vitamins-Minerals (MULTIVITAMIN WOMEN 50+ PO) Take 1 tablet by mouth daily         Allergies:   Prednisone    Patient History:  Past Medical History:   Diagnosis Date    Asthma     Hypertension     Obesity      Past Surgical History:   Procedure Laterality Date    TOTAL HIP ARTHROPLASTY       Family History   Problem Relation Age of Onset    Other Mother         lymphoma    Stroke Mother     High Blood Pressure Mother     High Cholesterol Mother     Prostate Cancer Father     Colon Cancer Father     Other Father         Melanoma skin cancer    Diabetes Father     High Cholesterol Father      Social History     Tobacco Use    Smoking status: Never    Smokeless tobacco: Never   Substance Use Topics    Alcohol use: Not Currently        Review of Systems: Constitutional: No Fever or Weight Loss   Eyes: No Decreased Vision  ENT: No Headaches, Hearing Loss or Vertigo  Cardiovascular: as per note above   Respiratory: No cough or wheezing and as per note above. Gastrointestinal: No abdominal pain, appetite loss, blood in stools, constipation, diarrhea or heartburn  Genitourinary: No dysuria, trouble voiding, or hematuria  Musculoskeletal:  denies any new  joint aches , swelling  or pain   Integumentary: No rash or pruritis  Neurological: No TIA or stroke symptoms  Psychiatric: No anxiety or depression  Endocrine: No malaise, fatigue or temperature intolerance  Hematologic/Lymphatic: No bleeding problems, blood clots or swollen lymph nodes  Allergic/Immunologic: No nasal congestion or hives    Objective:      Physical Exam:  /66   Pulse 77   Resp 14   Ht 5' 2\" (1.575 m)   Wt 188 lb (85.3 kg)   SpO2 100%   BMI 34.39 kg/m²   Wt Readings from Last 3 Encounters:   07/27/22 188 lb (85.3 kg)   06/29/22 192 lb (87.1 kg)   06/25/22 215 lb 13.3 oz (97.9 kg)     Body mass index is 34.39 kg/m². Vitals:    07/27/22 0934   BP: 138/66   Pulse: 77   Resp: 14   SpO2: 100%        General Appearance:  No distress, conversant  Constitutional:  Well developed, Well nourished, No acute distress, Non-toxic appearance. HENT:  Normocephalic, Atraumatic, Bilateral external ears normal, Oropharynx moist, No oral exudates, Nose normal. Neck- Normal range of motion, No tenderness, Supple, No stridor,no apical-carotid delay  Eyes:  PERRL, EOMI, Conjunctiva normal, No discharge. Respiratory:  Normal breath sounds, No respiratory distress, No wheezing, No chest tenderness. ,no use of accessory muscles, NO crackles  Cardiovascular: (PMI) apex non displaced,no lifts no thrills,S1 and S2 audible, No added heart sounds, No signs of ankle edema, or volume overload, No evidence of JVD, No crackles   GI:  Bowel sounds normal, Soft, No tenderness, No masses, No gross visceromegaly   :  No costovertebral angle tenderness   Musculoskeletal:  No edema, no tenderness, no deformities. Back- no tenderness  Integument:  Well hydrated, no rash   Lymphatic:  No lymphadenopathy noted   Neurologic:  Alert & oriented x 3, CN 2-12 normal, normal motor function, normal sensory function, no focal deficits noted   Psychiatric:  Speech and behavior appropriate       Medical decision making and Data review:  DATA:  No results found for: TROPONINT  BNP:    Lab Results   Component Value Date    PROBNP 29.36 07/27/2022     PT/INR:  No results found for: PTINR  No results found for: LABA1C  No results found for: CHOL, TRIG, HDL, LDLCALC, LDLDIRECT  Lab Results   Component Value Date    ALT 23 07/27/2022    AST 27 07/27/2022     Recent Labs     07/27/22  0854   WBC 4.9   HGB 12.5   HCT 39.4   MCV 92.7   *     TSH: No results found for: TSH  Lab Results   Component Value Date    AST 27 07/27/2022    ALT 23 07/27/2022    BILIDIR 0.2 06/16/2021    BILITOT 0.4 07/27/2022    ALKPHOS 116 07/27/2022     Lab Results   Component Value Date    PROBNP 29.36 07/27/2022    PROBNP 43.87 06/29/2022    PROBNP 75.53 06/16/2022     No results found for: LABA1C  Lab Results   Component Value Date    WBC 4.9 07/27/2022    HGB 12.5 07/27/2022    HCT 39.4 07/27/2022     (L) 07/27/2022     All labs, medications and tests reviewed by myself including data and history from outside source , patient and available family . Assessment & Plan:    POST TAVR pseudoaneurism resolved  Follow up with primary cardiologist  Refer to cardiac rehab      Counseled extensively and medication compliance urged. We discussed that for the  prevention of ASCVD our  goal is aggressive risk modification. Patient is encouraged to exercise if they can , educated about  brisk walk for 30 minutes  at least 3 to 4 times a week if there are no physical limitations  Various goals were discussed and questions answered. Continue current medications.  Appropriate prescriptions are addressed and refills ordered. Questions answered and patient verbalizes understanding. Call for any problems, questions, or concerns. Greater than 60 % of time spent counseling besides reviewing data and images     Continue all other medications of all above medical condition listed as is. No follow-ups on file. Please note this report has been partially produced using speech recognition software and may contain errors related to that system including errors in grammar, punctuation, and spelling, as well as words and phrases that may be inappropriate. If there are any questions or concerns please feel free to contact the dictating provider for clarification.

## 2022-07-29 LAB
EKG ATRIAL RATE: 62 BPM
EKG DIAGNOSIS: NORMAL
EKG P AXIS: 35 DEGREES
EKG P-R INTERVAL: 192 MS
EKG Q-T INTERVAL: 416 MS
EKG QRS DURATION: 110 MS
EKG QTC CALCULATION (BAZETT): 422 MS
EKG R AXIS: -14 DEGREES
EKG T AXIS: 29 DEGREES
EKG VENTRICULAR RATE: 62 BPM

## 2022-07-29 PROCEDURE — 93010 ELECTROCARDIOGRAM REPORT: CPT | Performed by: INTERNAL MEDICINE

## 2022-11-22 ENCOUNTER — HOSPITAL ENCOUNTER (OUTPATIENT)
Dept: INFUSION THERAPY | Age: 67
Discharge: HOME | End: 2022-11-22
Payer: MEDICARE

## 2022-11-22 DIAGNOSIS — K74.3 HEPATIC CIRRHOSIS DUE TO PRIMARY BILIARY CHOLANGITIS (HCC): ICD-10-CM

## 2022-11-22 LAB
ALBUMIN SERPL-MCNC: 4.1 GM/DL (ref 3.4–5)
ALP BLD-CCNC: 109 IU/L (ref 40–129)
ALT SERPL-CCNC: 29 U/L (ref 10–40)
ANION GAP SERPL CALCULATED.3IONS-SCNC: 8 MMOL/L (ref 4–16)
AST SERPL-CCNC: 26 IU/L (ref 15–37)
BASOPHILS ABSOLUTE: 0 K/CU MM
BASOPHILS RELATIVE PERCENT: 0.7 % (ref 0–1)
BILIRUB SERPL-MCNC: 0.7 MG/DL (ref 0–1)
BUN BLDV-MCNC: 16 MG/DL (ref 6–23)
CALCIUM SERPL-MCNC: 9.7 MG/DL (ref 8.3–10.6)
CHLORIDE BLD-SCNC: 103 MMOL/L (ref 99–110)
CO2: 26 MMOL/L (ref 21–32)
CREAT SERPL-MCNC: 0.5 MG/DL (ref 0.6–1.1)
DIFFERENTIAL TYPE: ABNORMAL
EOSINOPHILS ABSOLUTE: 0.3 K/CU MM
EOSINOPHILS RELATIVE PERCENT: 5.5 % (ref 0–3)
ERYTHROCYTE SEDIMENTATION RATE: 29 MM/HR (ref 0–30)
FERRITIN: 101 NG/ML (ref 15–150)
GFR SERPL CREATININE-BSD FRML MDRD: >60 ML/MIN/1.73M2
GLUCOSE BLD-MCNC: 191 MG/DL (ref 70–99)
HCT VFR BLD CALC: 41.9 % (ref 37–47)
HEMOGLOBIN: 14.2 GM/DL (ref 12.5–16)
IRON: 153 UG/DL (ref 37–145)
LACTATE DEHYDROGENASE: 250 IU/L (ref 120–246)
LYMPHOCYTES ABSOLUTE: 1.2 K/CU MM
LYMPHOCYTES RELATIVE PERCENT: 21.2 % (ref 24–44)
MCH RBC QN AUTO: 28.9 PG (ref 27–31)
MCHC RBC AUTO-ENTMCNC: 33.9 % (ref 32–36)
MCV RBC AUTO: 85.3 FL (ref 78–100)
MONOCYTES ABSOLUTE: 0.7 K/CU MM
MONOCYTES RELATIVE PERCENT: 11.5 % (ref 0–4)
PCT TRANSFERRIN: 39 % (ref 10–44)
PDW BLD-RTO: 15.6 % (ref 11.7–14.9)
PLATELET # BLD: 90 K/CU MM (ref 140–440)
PMV BLD AUTO: 11 FL (ref 7.5–11.1)
POTASSIUM SERPL-SCNC: 4.8 MMOL/L (ref 3.5–5.1)
RBC # BLD: 4.91 M/CU MM (ref 4.2–5.4)
SEGMENTED NEUTROPHILS ABSOLUTE COUNT: 3.6 K/CU MM
SEGMENTED NEUTROPHILS RELATIVE PERCENT: 61.1 % (ref 36–66)
SODIUM BLD-SCNC: 137 MMOL/L (ref 135–145)
TOTAL IRON BINDING CAPACITY: 388 UG/DL (ref 250–450)
TOTAL PROTEIN: 7.4 GM/DL (ref 6.4–8.2)
UNSATURATED IRON BINDING CAPACITY: 235 UG/DL (ref 110–370)
WBC # BLD: 5.8 K/CU MM (ref 4–10.5)

## 2022-11-22 PROCEDURE — 85025 COMPLETE CBC W/AUTO DIFF WBC: CPT

## 2022-11-22 PROCEDURE — 80053 COMPREHEN METABOLIC PANEL: CPT

## 2022-11-22 PROCEDURE — 83615 LACTATE (LD) (LDH) ENZYME: CPT

## 2022-11-22 PROCEDURE — 83550 IRON BINDING TEST: CPT

## 2022-11-22 PROCEDURE — 36415 COLL VENOUS BLD VENIPUNCTURE: CPT

## 2022-11-22 PROCEDURE — 85652 RBC SED RATE AUTOMATED: CPT

## 2022-11-22 PROCEDURE — 82728 ASSAY OF FERRITIN: CPT

## 2022-11-22 PROCEDURE — 83540 ASSAY OF IRON: CPT

## 2022-12-16 ENCOUNTER — HOSPITAL ENCOUNTER (OUTPATIENT)
Dept: INFUSION THERAPY | Age: 67
Discharge: HOME | End: 2022-12-16
Payer: MEDICARE

## 2022-12-16 ENCOUNTER — OFFICE VISIT (OUTPATIENT)
Dept: ONCOLOGY | Age: 67
End: 2022-12-16
Payer: MEDICARE

## 2022-12-16 VITALS
HEART RATE: 69 BPM | OXYGEN SATURATION: 96 % | SYSTOLIC BLOOD PRESSURE: 174 MMHG | DIASTOLIC BLOOD PRESSURE: 72 MMHG | BODY MASS INDEX: 36.07 KG/M2 | WEIGHT: 196 LBS | HEIGHT: 62 IN | TEMPERATURE: 97.4 F | RESPIRATION RATE: 18 BRPM

## 2022-12-16 DIAGNOSIS — D69.6 THROMBOCYTOPENIA (HCC): ICD-10-CM

## 2022-12-16 DIAGNOSIS — D68.9 COAGULATION DEFECT (HCC): ICD-10-CM

## 2022-12-16 DIAGNOSIS — K74.3 HEPATIC CIRRHOSIS DUE TO PRIMARY BILIARY CHOLANGITIS (HCC): Primary | ICD-10-CM

## 2022-12-16 PROCEDURE — 1090F PRES/ABSN URINE INCON ASSESS: CPT | Performed by: INTERNAL MEDICINE

## 2022-12-16 PROCEDURE — G8417 CALC BMI ABV UP PARAM F/U: HCPCS | Performed by: INTERNAL MEDICINE

## 2022-12-16 PROCEDURE — G8427 DOCREV CUR MEDS BY ELIG CLIN: HCPCS | Performed by: INTERNAL MEDICINE

## 2022-12-16 PROCEDURE — G8484 FLU IMMUNIZE NO ADMIN: HCPCS | Performed by: INTERNAL MEDICINE

## 2022-12-16 PROCEDURE — 3017F COLORECTAL CA SCREEN DOC REV: CPT | Performed by: INTERNAL MEDICINE

## 2022-12-16 PROCEDURE — 1036F TOBACCO NON-USER: CPT | Performed by: INTERNAL MEDICINE

## 2022-12-16 PROCEDURE — 99211 OFF/OP EST MAY X REQ PHY/QHP: CPT

## 2022-12-16 PROCEDURE — 1123F ACP DISCUSS/DSCN MKR DOCD: CPT | Performed by: INTERNAL MEDICINE

## 2022-12-16 PROCEDURE — 99213 OFFICE O/P EST LOW 20 MIN: CPT | Performed by: INTERNAL MEDICINE

## 2022-12-16 PROCEDURE — G8400 PT W/DXA NO RESULTS DOC: HCPCS | Performed by: INTERNAL MEDICINE

## 2022-12-16 NOTE — PROGRESS NOTES
Patient Name:  Alec Davies  Patient :  1955  Patient MRN:  [id number]     Primary Oncologist: Jose Guadalupe Ayala MD  Referring Provider: Arcelia Ramos     Date of Service: 2022      Chief Complaint:    Chief Complaint   Patient presents with    Follow-up     Patient Active Problem List:     Coagulation defect [facility])     Nonrheumatic aortic valve stenosis    HPI:   Alec Davies is a 56-year-old very pleasant female with medical history significant for hypertension, sarcoidosis, primary biliary cholangitis on actigall, cirrhosis of the liver and aortic stenosis, referred to me on 2022 for evaluation of her thrombocytopenia. She has thrombocytopenia since 2017. She planned to have aortic valve replacement for aortic stenosis. Because of thrombocytopenia, she was referred to me for further evaluation. I have discussed with Dr. Michelle Valera and he wants her platelet count more than 100,000/cumm for surgery. Laboratory work-ups done on 2022 showed mild thrombocytopenia (the platelet 521,935/QSCJ). Her hemoglobin, WBC, LDH, VIOLETTA, rheumatoid factor, B12 and folate were within normal range. On 2022, she presented to me for follow-up. She was initially referred to me for evaluation of her thrombocytopenia. I reviewed findings on laboratory work ups done on 22. Her thrombocytopenia is due to underlying liver cirrhosis (e.g impaired platelet production from decreased hepatic synthesis of thrombopoietin and increased platelet sequestration in the spleen, in the setting of portal hypertension and hypersplenism) and may be partly due to sarcoidosis. Since her platelet count on  was 100,000/cumm, I recommend to proceed with aortic valve surgery as planned and to give platelet transfusion as needed before, during or after surgery as needed. It will be more predictable and reliable than using avatrombopag. I discussed that with Dr. Michelle Valera.  He had TAVA procedure on 2022 after platelet transfusion. I recognized that she has stable platelet count on 71/85/27. Rests of labs are normal. Will continue with observation. I will continue to follow her thrombocytopenia periodically. She denies easy bruising or bleeding before. Past Medical History:     Significant for  1. Hypertension  2. Sarcoidosis  3. 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 Aortic stenosis    Past Surgery History:    Significant for  1. Bilateral hip replacement    Social History:   She denies smoking, alcohol drinking or illicit drug abuse. She has 2 daughters. Family History:    Significant for colon cancer, prostate cancer and melanoma in her father. Her mother had non-Hodgkin lymphoma. Allergies   Allergen Reactions    Prednisone      Has a sensitivity  Other reaction(s): Joint Pain      Review of Systems: \"Per interval history; otherwise 10 point ROS is negative. \"  Her energy level is good and her sleep is fine. She doesn't have fever, chills, night sweats, cough, SOB, chest pain, hemoptysis or palpitations. Her bowels and bladder functions are normal. She denies nausea, vomiting, abdominal pain, diarrhea, constipation, dysuria, loss of appetite or weight loss. She doesn't have neuropathy and she denies bleeding or clotting issues. She doesn't have any pain in her body. No anxiety or depression. The rest of the systems are unremarkable.      Vital Signs: BP (!) 174/72 (Site: Left Upper Arm, Position: Sitting, Cuff Size: Large Adult)   Pulse 69   Temp 97.4 °F (36.3 °C) (Infrared)   Resp 18   Ht 5' 2\" (1.575 m)   Wt 196 lb (88.9 kg)   SpO2 96%   BMI 35.85 kg/m²      Physical Exam:  CONSTITUTIONAL: awake, alert, cooperative, no apparent distress   EYES: pupils equal, round and reactive to light, sclera clear, normal conjunctiva  ENT: Normocephalic, without obvious abnormality, atraumatic  NECK: supple, symmetrical, no jugular venous distension, no carotid bruits HEMATOLOGIC/LYMPHATIC: no cervical, supraclavicular or axillary lymphadenopathy   LUNGS: VBS, no wheezes, clear to auscultation, no crackles, no increased work of breathing, no rhonchi,    CARDIOVASCULAR: regular rate and rhythm, normal S1 and S2, murmur noted. ABDOMEN: normal bowel sounds x 4, soft, non-distended, non-tender, no masses palpated, no hepatosplenomegaly   MUSCULOSKELETAL: full range of motion noted, tone is normal  NEUROLOGIC: awake, alert, oriented to name, place and time. Motor skills grossly intact. SKIN: appears intact, normal skin color, normal texture, normal turgor, no jaundice.    EXTREMITIES: no clubbing, no leg swelling, no LE edema, no cyanosis,       Labs:  Hematology:  Lab Results   Component Value Date    WBC 5.8 11/22/2022    RBC 4.91 11/22/2022    HGB 14.2 11/22/2022    HCT 41.9 11/22/2022    MCV 85.3 11/22/2022    MCH 28.9 11/22/2022    MCHC 33.9 11/22/2022    RDW 15.6 (H) 11/22/2022    PLT 90 (L) 11/22/2022    MPV 11.0 11/22/2022    BANDSPCT 1 (L) 05/02/2022    SEGSPCT 61.1 11/22/2022    EOSRELPCT 5.5 (H) 11/22/2022    BASOPCT 0.7 11/22/2022    LYMPHOPCT 21.2 (L) 11/22/2022    MONOPCT 11.5 (H) 11/22/2022    BANDABS 0.05 05/02/2022    SEGSABS 3.6 11/22/2022    EOSABS 0.3 11/22/2022    BASOSABS 0.0 11/22/2022    LYMPHSABS 1.2 11/22/2022    MONOSABS 0.7 11/22/2022    DIFFTYPE AUTOMATED DIFFERENTIAL 11/22/2022     Lab Results   Component Value Date    ESR 29 11/22/2022     Chemistry:  Lab Results   Component Value Date     11/22/2022    K 4.8 11/22/2022     11/22/2022    CO2 26 11/22/2022    BUN 16 11/22/2022    CREATININE 0.5 (L) 11/22/2022    GLUCOSE 191 (H) 11/22/2022    CALCIUM 9.7 11/22/2022    PROT 7.4 11/22/2022    LABALBU 4.1 11/22/2022    BILITOT 0.7 11/22/2022    ALKPHOS 109 11/22/2022    AST 26 11/22/2022    ALT 29 11/22/2022    LABGLOM >60 11/22/2022    GFRAA >60 07/27/2022     Lab Results   Component Value Date     (H) 11/22/2022     No components found for: LD  No results found for: TSHHS, T4FREE, FT3  Immunology:  Lab Results   Component Value Date    PROT 7.4 11/22/2022    ALBUMINELP 3.9 03/01/2019    LABALPH 0.3 03/01/2019    LABALPH 0.7 03/01/2019    LABBETA 1.1 03/01/2019    GAMGLOB 1.6 03/01/2019     No results found for: Evonnie Corrente, KLFLCR  No results found for: B2M  Coagulation Panel:  Lab Results   Component Value Date    PROTIME 15.8 (H) 07/27/2022    INR 1.22 07/27/2022    APTT 30.2 07/27/2022     Anemia Panel:  Lab Results   Component Value Date    Salem Bibiana 990.5 (H) 05/02/2022    FOLATE >20.0 (H) 05/02/2022     Tumor Markers:  No results found for: , CEA, , LABCA2, PSA     Observations:  No data recorded     Assessment   Thrombocytopenia - likely due to hypersplenism due to underlying cirrhosis of liver    Plan:  Estrellita Jones is a 78-year-old very pleasant female with medical history significant for sarcoidosis, 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 She has thrombocytopenia since 9/2017. She planned to have aortic valve replacement for aortic stenosis and Dr. Chery Garcia wants her platelet count to be more than 100,000/cumm for surgery. Laboratory work-ups done on 5/2/2022 showed mild thrombocytopenia (the platelet 101,886/YVDL). Her hemoglobin, WBC, LDH, VIOLETTA, rheumatoid factor, B12 and folate were within normal range. On December 16, 2022, she presented to me for follow-up. She was initially referred to me for evaluation of her thrombocytopenia. I reviewed findings on laboratory work ups done on 5/2/22. Her thrombocytopenia is due to underlying liver cirrhosis (e.g impaired platelet production from decreased hepatic synthesis of thrombopoietin and increased platelet sequestration in the spleen, in the setting of portal hypertension and hypersplenism) and may be partly due to sarcoidosis.      Since her platelet count on 8/5/91 was 100,000/cumm, I recommend to proceed with aortic valve surgery as planned and to give platelet transfusion as needed before, during or after surgery as needed. It will be more predictable and reliable than using avatrombopag. I discussed that with Dr. Daniel Krueger. He had TAVA procedure on 6/2022 after platelet transfusion. I recognized that she has stable platelet count on 63/61/42. Rests of labs are normal. Will continue with observation. I will continue to follow her thrombocytopenia periodically. I answered all her questions and concerns for today. Recent imaging and labs were reviewed and discussed with the patient.

## 2022-12-16 NOTE — PROGRESS NOTES
[facility] Rooming Questions  Patient: Angelic Mendez  MRN: [id number]    Date: 12/16/2022        1. Do you have any new issues?   no         2. Do you need any refills on medications?    no    3. Have you had any imaging done since your last visit?   no    4. Have you been hospitalized or seen in the emergency room since your last visit here?   no    5. Did the patient have a depression screening completed today?  No    No data recorded     PHQ-9 Given to (if applicable):               PHQ-9 Score (if applicable):                     [] Positive     []  Negative              Does question #9 need addressed (if applicable)                     [] Yes    []  No               Bhargavi Gracia MA